# Patient Record
Sex: FEMALE | Race: OTHER | HISPANIC OR LATINO | Employment: FULL TIME | URBAN - METROPOLITAN AREA
[De-identification: names, ages, dates, MRNs, and addresses within clinical notes are randomized per-mention and may not be internally consistent; named-entity substitution may affect disease eponyms.]

---

## 2018-06-22 DIAGNOSIS — Z12.39 BREAST CANCER SCREENING: ICD-10-CM

## 2018-06-27 LAB
HUMAN PAPILLOMAVIRUS (HPV): NORMAL
PAP SMEAR: NORMAL

## 2018-07-16 ENCOUNTER — TELEPHONE (OUTPATIENT)
Dept: INTERNAL MEDICINE | Facility: CLINIC | Age: 48
End: 2018-07-16

## 2018-07-16 NOTE — TELEPHONE ENCOUNTER
----- Message from Suzette Thorpe sent at 7/16/2018  4:05 PM CDT -----  Contact: WALLY ECKERT [[id number]]            Name of Who is Calling: WALLY ECKERT [[id number]]    What is the request in detail: patient would like a call back states she felt dizzy today at work and would like to see doctor as soon as possible. Please call     Can the clinic reply by [facility]: no    What Number to Call Back if not in [facility]: [phone]

## 2018-07-16 NOTE — TELEPHONE ENCOUNTER
----- Message from Marge Smallwood sent at 7/16/2018  4:35 PM CDT -----  Contact: pt            Name of Who is Calling: pt       What is the request in detail: pt returned the nurse's phone call. Call pt      Can the clinic reply by [facility]: no      What [facility] Back [facility]: [phone]    5:09 PM  Patient has been scheduled as requested.

## 2018-07-17 ENCOUNTER — OFFICE VISIT (OUTPATIENT)
Dept: PRIMARY CARE CLINIC | Facility: CLINIC | Age: 48
End: 2018-07-17
Attending: FAMILY MEDICINE
Payer: COMMERCIAL

## 2018-07-17 VITALS
DIASTOLIC BLOOD PRESSURE: 70 MMHG | TEMPERATURE: 98 F | HEIGHT: 62 IN | OXYGEN SATURATION: 100 % | BODY MASS INDEX: 26.45 KG/M2 | HEART RATE: 83 BPM | WEIGHT: 143.75 LBS | SYSTOLIC BLOOD PRESSURE: 118 MMHG

## 2018-07-17 DIAGNOSIS — H91.92 HEARING DECREASED, LEFT: ICD-10-CM

## 2018-07-17 DIAGNOSIS — J30.9 ALLERGIC RHINITIS, UNSPECIFIED SEASONALITY, UNSPECIFIED TRIGGER: ICD-10-CM

## 2018-07-17 DIAGNOSIS — H61.22 IMPACTED CERUMEN OF LEFT EAR: Primary | ICD-10-CM

## 2018-07-17 PROCEDURE — 3008F BODY MASS INDEX DOCD: CPT | Mod: CPTII,S$GLB,, | Performed by: NURSE PRACTITIONER

## 2018-07-17 PROCEDURE — 99999 PR PBB SHADOW E&M-EST. PATIENT-LVL IV: CPT | Mod: PBBFAC,,, | Performed by: NURSE PRACTITIONER

## 2018-07-17 PROCEDURE — 99213 OFFICE O/P EST LOW 20 MIN: CPT | Mod: S$GLB,,, | Performed by: NURSE PRACTITIONER

## 2018-07-17 NOTE — PROGRESS NOTES
Chief Complaint: Dizziness (thinking it may be from sinus); Sinus Problem (taking mucinex, tylenol); and Otalgia      HPI     Lila Pineda is a 47 y.o. female who presents for an urgent visit today. She is an established patient of Dr Vinson but new to me.      She felt dizzy yesterday for a few minutes, like the room was spinning. She put her head down and the dizziness resolved. She is also complaining of left ear fullness and pressure. No pain. Hearing is muted. Denies chest pain and SOB.    She has been having sinus/allergies problems for the past week, with sneezing and runny nose. She took Mucinex (with Tylenol and decongestant) a couple of days ago with mild relief.  +post nasal drip +dry cough. Denies fever/chills.        Past Medical History:  Past Medical History:   Diagnosis Date    Perimenopausal     Shingles        Review of Systems:  Review of Systems   Constitutional: Negative for appetite change, chills, fatigue and fever.   HENT: Positive for congestion, ear pain, postnasal drip, rhinorrhea, sinus pressure and sneezing. Negative for sinus pain and sore throat.    Eyes: Negative for visual disturbance.   Respiratory: Positive for cough (dry). Negative for chest tightness and shortness of breath.    Cardiovascular: Negative for chest pain, palpitations and leg swelling.   Gastrointestinal: Negative for abdominal pain, blood in stool, constipation, diarrhea, nausea and vomiting.   Endocrine: Negative for polydipsia, polyphagia and polyuria.   Genitourinary: Negative for difficulty urinating, dysuria, flank pain and frequency.   Musculoskeletal: Negative for arthralgias, back pain, joint swelling and myalgias.   Skin: Negative for rash.   Neurological: Positive for dizziness (resolved). Negative for syncope, weakness, light-headedness and headaches.   Psychiatric/Behavioral: Negative for dysphoric mood and sleep disturbance. The patient is not nervous/anxious.          PHYSICAL EXAM  "    Vitals:    07/17/18 0920 07/17/18 1017   BP: 137/77 118/70   Pulse: 83    Temp: 98.3 °F (36.8 °C)    TempSrc: Axillary    SpO2: 100%    Weight: 65.2 kg (143 lb 11.8 oz)    Height: 5' 2" (1.575 m)        Physical Exam   Constitutional: She is oriented to person, place, and time. She appears well-developed and well-nourished.   HENT:   Head: Normocephalic and atraumatic.   Right Ear: Hearing, tympanic membrane, external ear and ear canal normal. Tympanic membrane is not injected and not erythematous. No middle ear effusion. No decreased hearing is noted.   Left Ear: External ear and ear canal normal. Decreased hearing is noted.   Nose: Nose normal.   Mouth/Throat: Oropharynx is clear and moist. No oropharyngeal exudate.   Left ear with cerumen impaction. Unable to visualize TM.   Eyes: Conjunctivae and EOM are normal. Pupils are equal, round, and reactive to light.   Neck: Normal range of motion. Neck supple. No tracheal deviation present. No thyromegaly present.   Cardiovascular: Normal rate, regular rhythm, normal heart sounds and intact distal pulses.    No murmur heard.  Pulmonary/Chest: Effort normal and breath sounds normal. No respiratory distress. She has no wheezes. She has no rales.   Lymphadenopathy:     She has no cervical adenopathy.   Neurological: She is alert and oriented to person, place, and time.   Skin: Skin is warm and dry. She is not diaphoretic. No erythema.   Psychiatric: She has a normal mood and affect. Her behavior is normal. Thought content normal.       Assessment:       1. Impacted cerumen of left ear    2. Allergic rhinitis, unspecified seasonality, unspecified trigger    3. Hearing decreased, left        Plan:       Lila was seen today for dizziness, sinus problem and otalgia.    Diagnoses and all orders for this visit:    Impacted cerumen of left ear-unable to visualize left TM. Right TM unremarkable. Recommended Debrox solution and referral to ENT for removal. May/may not have " been cause for brief episode of dizziness/vertigo x1. ENT can further evaluate dizziness if  re-occurs.  -     Ambulatory Referral to ENT    Allergic rhinitis, unspecified seasonality, unspecified trigger-Recommended nasal irrigations followed by Flonase daily, and an antihistamine (zyrtec or claritin).    Hearing decreased, left-likely related to cerumen impaction.    Follow up with PCP as needed. Call clinic if symptoms fail to improve.

## 2018-07-17 NOTE — PATIENT INSTRUCTIONS
1)Distilled salt water sinus rinses via neti pots or products such as Kb Med Sinus Rinse or Sinugator. Must wash container or device and use bottled water or distilled water to avoid introducing infection.  2)Nasal Steroids (Nasocort, Rhinocort, Flonase) fluticasone  3)Antihistamines(Allegra, Claritin, Xzyal, Zyrtec)  4)Decongestants (Pseudoephedrine)      Debrox solution to soften ear wax.    Will refer you to ENT

## 2019-01-07 ENCOUNTER — OFFICE VISIT (OUTPATIENT)
Dept: PRIMARY CARE CLINIC | Facility: CLINIC | Age: 49
End: 2019-01-07
Payer: COMMERCIAL

## 2019-01-07 VITALS
DIASTOLIC BLOOD PRESSURE: 77 MMHG | HEIGHT: 62 IN | WEIGHT: 142.75 LBS | OXYGEN SATURATION: 100 % | BODY MASS INDEX: 26.27 KG/M2 | SYSTOLIC BLOOD PRESSURE: 139 MMHG | TEMPERATURE: 98 F | HEART RATE: 93 BPM

## 2019-01-07 DIAGNOSIS — R59.0 CERVICAL ADENOPATHY: ICD-10-CM

## 2019-01-07 DIAGNOSIS — R60.0 SALIVARY GLAND SWELLING: Primary | ICD-10-CM

## 2019-01-07 PROCEDURE — 3008F PR BODY MASS INDEX (BMI) DOCUMENTED: ICD-10-PCS | Mod: CPTII,S$GLB,, | Performed by: NURSE PRACTITIONER

## 2019-01-07 PROCEDURE — 3008F BODY MASS INDEX DOCD: CPT | Mod: CPTII,S$GLB,, | Performed by: NURSE PRACTITIONER

## 2019-01-07 PROCEDURE — 99999 PR PBB SHADOW E&M-EST. PATIENT-LVL III: ICD-10-PCS | Mod: PBBFAC,,, | Performed by: NURSE PRACTITIONER

## 2019-01-07 PROCEDURE — 99999 PR PBB SHADOW E&M-EST. PATIENT-LVL III: CPT | Mod: PBBFAC,,, | Performed by: NURSE PRACTITIONER

## 2019-01-07 PROCEDURE — 99213 OFFICE O/P EST LOW 20 MIN: CPT | Mod: S$GLB,,, | Performed by: NURSE PRACTITIONER

## 2019-01-07 PROCEDURE — 99213 PR OFFICE/OUTPT VISIT, EST, LEVL III, 20-29 MIN: ICD-10-PCS | Mod: S$GLB,,, | Performed by: NURSE PRACTITIONER

## 2019-01-07 NOTE — PROGRESS NOTES
Chief Complaint: Adenopathy      HPI     Lila Pineda is a 48 y.o. female who presents for an urgent visit today. She is an established patient of Dr Vinson. She was seen by me on 7/17/18 for impacted cerumen and allergic rhinitis.     She c/o swollen glands for a couple of weeks now. Left cervical lymph node swells with eating, mainly with spicy food. Only lasts 30 -40 min. Tender to touch. Does have a new wire on her braces that is now hitting cheek. She has started to use wax, which has helped with the swelling. Using a mouth rinse and warm saltwater gargles as well.   Occurs every day but is getting better with mouth rinses and using wax. Does not see orthodontist again til Feb.    Denies URI, fever/night sweats (although does get hot flashes bc perimenopausal), unexplained weight loss. No cats. No foreign travel (did travel to [place] early Dec). Feels fine otherwise.      Past Medical History:  Past Medical History:   Diagnosis Date    Perimenopausal     Shingles        Review of Systems:  Review of Systems   Constitutional: Negative for activity change, appetite change, chills, diaphoresis, fatigue, fever and unexpected weight change.   HENT: Negative for congestion, sinus pressure and sore throat.    Eyes: Negative for visual disturbance.   Respiratory: Negative for cough, chest tightness, shortness of breath and wheezing.    Cardiovascular: Negative for chest pain, palpitations and leg swelling.   Gastrointestinal: Negative for abdominal pain, blood in stool, constipation, diarrhea, nausea and vomiting.   Genitourinary: Negative for difficulty urinating, dysuria, flank pain and frequency.   Musculoskeletal: Negative for arthralgias, back pain, joint swelling and myalgias.   Skin: Negative for rash.   Neurological: Negative for dizziness, syncope, weakness, light-headedness and headaches.   Hematological: Positive for adenopathy.         PHYSICAL EXAM     Vitals:    01/07/19 0809   BP: 139/77  "  BP Location: Left arm   Patient Position: Sitting   Pulse: 93   Temp: 98.4 °F (36.9 °C)   SpO2: 100%   Weight: 64.8 kg (142 lb 12 oz)   Height: 5' 2" (1.575 m)       Physical Exam   Constitutional: She is oriented to person, place, and time. She appears well-developed and well-nourished. No distress.   HENT:   Head: Normocephalic and atraumatic.   Right Ear: Tympanic membrane, external ear and ear canal normal.   Left Ear: External ear normal.   Nose: Nose normal.   Mouth/Throat: Oropharynx is clear and moist and mucous membranes are normal. Oral lesions (mild left inner cheek irritation from braces) present. No dental abscesses. No oropharyngeal exudate, posterior oropharyngeal edema, posterior oropharyngeal erythema or tonsillar abscesses. No tonsillar exudate.   Eyes: Conjunctivae and EOM are normal. Pupils are equal, round, and reactive to light.   Neck: Normal range of motion. Neck supple. No tracheal deviation present. No thyromegaly present.   Cardiovascular: Normal rate, regular rhythm, normal heart sounds and intact distal pulses. Exam reveals no friction rub.   No murmur heard.  Pulmonary/Chest: Effort normal and breath sounds normal. No stridor. No respiratory distress. She has no wheezes. She has no rales.   Abdominal: Soft. Bowel sounds are normal. She exhibits no distension and no mass. There is no tenderness. There is no guarding. No hernia.   Musculoskeletal: Normal range of motion. She exhibits no edema.   Lymphadenopathy:     She has no cervical adenopathy.   Neurological: She is alert and oriented to person, place, and time.   Skin: Skin is warm and dry. She is not diaphoretic. No erythema.   Psychiatric: She has a normal mood and affect. Her behavior is normal. Thought content normal.   Vitals reviewed.      Assessment:       1. Salivary gland swelling    2. Cervical adenopathy        Plan:       Lila CALLAWAY was seen today for adenopathy.    Diagnoses and all orders for this visit:    Salivary " gland swelling    Cervical adenopathy    PE normal. Unclear etiology. Intermittent swelling could be due to braces rubbing or due to salivary stone? (although none palpated).  No lymphadenopathy on exam today.  Encouraged sooner Orthodontist appt to make adjustments, moist warm compresses to area, and ibuprofen as needed. Can also try hard sour candies, if due to stone.  She will call me if no improvements in the next 1 -2 weeks and will investigate further.    Follow up with PCP as needed or if symptoms worsen or fail to improve over the next several days.

## 2020-04-21 DIAGNOSIS — Z01.84 ANTIBODY RESPONSE EXAMINATION: ICD-10-CM

## 2020-04-23 ENCOUNTER — LAB VISIT (OUTPATIENT)
Dept: LAB | Facility: HOSPITAL | Age: 50
End: 2020-04-23
Attending: INTERNAL MEDICINE
Payer: COMMERCIAL

## 2020-04-23 DIAGNOSIS — Z01.84 ANTIBODY RESPONSE EXAMINATION: ICD-10-CM

## 2020-04-23 LAB — SARS-COV-2 IGG SERPL QL IA: NEGATIVE

## 2020-04-23 PROCEDURE — 86769 SARS-COV-2 COVID-19 ANTIBODY: CPT

## 2020-04-23 PROCEDURE — 36415 COLL VENOUS BLD VENIPUNCTURE: CPT

## 2021-03-09 LAB — PAP SMEAR: NORMAL

## 2021-04-16 ENCOUNTER — PATIENT MESSAGE (OUTPATIENT)
Dept: RESEARCH | Facility: HOSPITAL | Age: 51
End: 2021-04-16

## 2021-07-15 ENCOUNTER — OFFICE VISIT (OUTPATIENT)
Dept: FAMILY MEDICINE | Facility: CLINIC | Age: 51
End: 2021-07-15
Payer: COMMERCIAL

## 2021-07-15 ENCOUNTER — PATIENT OUTREACH (OUTPATIENT)
Dept: ADMINISTRATIVE | Facility: HOSPITAL | Age: 51
End: 2021-07-15

## 2021-07-15 VITALS
HEART RATE: 90 BPM | DIASTOLIC BLOOD PRESSURE: 96 MMHG | WEIGHT: 146.81 LBS | BODY MASS INDEX: 27.02 KG/M2 | HEIGHT: 62 IN | OXYGEN SATURATION: 98 % | SYSTOLIC BLOOD PRESSURE: 124 MMHG

## 2021-07-15 DIAGNOSIS — N95.1 PERIMENOPAUSAL: ICD-10-CM

## 2021-07-15 DIAGNOSIS — Z12.31 ENCOUNTER FOR SCREENING MAMMOGRAM FOR MALIGNANT NEOPLASM OF BREAST: ICD-10-CM

## 2021-07-15 DIAGNOSIS — Z28.21 COVID-19 VIRUS VACCINATION DECLINED: ICD-10-CM

## 2021-07-15 DIAGNOSIS — Z11.59 NEED FOR HEPATITIS C SCREENING TEST: ICD-10-CM

## 2021-07-15 DIAGNOSIS — E55.9 VITAMIN D DEFICIENCY: ICD-10-CM

## 2021-07-15 DIAGNOSIS — E66.3 OVERWEIGHT WITH BODY MASS INDEX (BMI) OF 26 TO 26.9 IN ADULT: ICD-10-CM

## 2021-07-15 DIAGNOSIS — R00.2 INTERMITTENT PALPITATIONS: ICD-10-CM

## 2021-07-15 DIAGNOSIS — Z00.01 ENCOUNTER FOR GENERAL ADULT MEDICAL EXAMINATION WITH ABNORMAL FINDINGS: Primary | ICD-10-CM

## 2021-07-15 DIAGNOSIS — R03.0 ELEVATED BLOOD PRESSURE READING WITHOUT DIAGNOSIS OF HYPERTENSION: ICD-10-CM

## 2021-07-15 DIAGNOSIS — Z12.11 COLON CANCER SCREENING: ICD-10-CM

## 2021-07-15 PROCEDURE — 3008F PR BODY MASS INDEX (BMI) DOCUMENTED: ICD-10-PCS | Mod: CPTII,S$GLB,, | Performed by: FAMILY MEDICINE

## 2021-07-15 PROCEDURE — 99396 PR PREVENTIVE VISIT,EST,40-64: ICD-10-PCS | Mod: S$GLB,,, | Performed by: FAMILY MEDICINE

## 2021-07-15 PROCEDURE — 93005 EKG 12-LEAD: ICD-10-PCS | Mod: S$GLB,,, | Performed by: FAMILY MEDICINE

## 2021-07-15 PROCEDURE — 1126F AMNT PAIN NOTED NONE PRSNT: CPT | Mod: S$GLB,,, | Performed by: FAMILY MEDICINE

## 2021-07-15 PROCEDURE — 1126F PR PAIN SEVERITY QUANTIFIED, NO PAIN PRESENT: ICD-10-PCS | Mod: S$GLB,,, | Performed by: FAMILY MEDICINE

## 2021-07-15 PROCEDURE — 93010 EKG 12-LEAD: ICD-10-PCS | Mod: S$GLB,,, | Performed by: INTERNAL MEDICINE

## 2021-07-15 PROCEDURE — 3008F BODY MASS INDEX DOCD: CPT | Mod: CPTII,S$GLB,, | Performed by: FAMILY MEDICINE

## 2021-07-15 PROCEDURE — 99396 PREV VISIT EST AGE 40-64: CPT | Mod: S$GLB,,, | Performed by: FAMILY MEDICINE

## 2021-07-15 PROCEDURE — 99999 PR PBB SHADOW E&M-EST. PATIENT-LVL III: CPT | Mod: PBBFAC,,, | Performed by: FAMILY MEDICINE

## 2021-07-15 PROCEDURE — 99999 PR PBB SHADOW E&M-EST. PATIENT-LVL III: ICD-10-PCS | Mod: PBBFAC,,, | Performed by: FAMILY MEDICINE

## 2021-07-15 PROCEDURE — 93010 ELECTROCARDIOGRAM REPORT: CPT | Mod: S$GLB,,, | Performed by: INTERNAL MEDICINE

## 2021-07-15 PROCEDURE — 93005 ELECTROCARDIOGRAM TRACING: CPT | Mod: S$GLB,,, | Performed by: FAMILY MEDICINE

## 2021-07-16 ENCOUNTER — LAB VISIT (OUTPATIENT)
Dept: LAB | Facility: HOSPITAL | Age: 51
End: 2021-07-16
Attending: FAMILY MEDICINE
Payer: COMMERCIAL

## 2021-07-16 DIAGNOSIS — E55.9 VITAMIN D DEFICIENCY: ICD-10-CM

## 2021-07-16 DIAGNOSIS — Z00.01 ENCOUNTER FOR GENERAL ADULT MEDICAL EXAMINATION WITH ABNORMAL FINDINGS: ICD-10-CM

## 2021-07-16 DIAGNOSIS — Z11.59 NEED FOR HEPATITIS C SCREENING TEST: ICD-10-CM

## 2021-07-16 DIAGNOSIS — E66.3 OVERWEIGHT WITH BODY MASS INDEX (BMI) OF 26 TO 26.9 IN ADULT: ICD-10-CM

## 2021-07-16 LAB
ALBUMIN SERPL BCP-MCNC: 4.1 G/DL (ref 3.5–5.2)
ALP SERPL-CCNC: 88 U/L (ref 55–135)
ALT SERPL W/O P-5'-P-CCNC: 23 U/L (ref 10–44)
ANION GAP SERPL CALC-SCNC: 10 MMOL/L (ref 8–16)
AST SERPL-CCNC: 19 U/L (ref 10–40)
BASOPHILS # BLD AUTO: 0.04 K/UL (ref 0–0.2)
BASOPHILS NFR BLD: 0.6 % (ref 0–1.9)
BILIRUB SERPL-MCNC: 1.1 MG/DL (ref 0.1–1)
BUN SERPL-MCNC: 8 MG/DL (ref 6–20)
CALCIUM SERPL-MCNC: 9.4 MG/DL (ref 8.7–10.5)
CHLORIDE SERPL-SCNC: 106 MMOL/L (ref 95–110)
CHOLEST SERPL-MCNC: 194 MG/DL (ref 120–199)
CHOLEST/HDLC SERPL: 4.1 {RATIO} (ref 2–5)
CO2 SERPL-SCNC: 24 MMOL/L (ref 23–29)
CREAT SERPL-MCNC: 0.7 MG/DL (ref 0.5–1.4)
DIFFERENTIAL METHOD: NORMAL
EOSINOPHIL # BLD AUTO: 0 K/UL (ref 0–0.5)
EOSINOPHIL NFR BLD: 0.3 % (ref 0–8)
ERYTHROCYTE [DISTWIDTH] IN BLOOD BY AUTOMATED COUNT: 13 % (ref 11.5–14.5)
EST. GFR  (AFRICAN AMERICAN): >60 ML/MIN/1.73 M^2
EST. GFR  (NON AFRICAN AMERICAN): >60 ML/MIN/1.73 M^2
ESTIMATED AVG GLUCOSE: 97 MG/DL (ref 68–131)
GLUCOSE SERPL-MCNC: 94 MG/DL (ref 70–110)
HBA1C MFR BLD: 5 % (ref 4–5.6)
HCT VFR BLD AUTO: 39.7 % (ref 37–48.5)
HDLC SERPL-MCNC: 47 MG/DL (ref 40–75)
HDLC SERPL: 24.2 % (ref 20–50)
HGB BLD-MCNC: 13.1 G/DL (ref 12–16)
IMM GRANULOCYTES # BLD AUTO: 0.01 K/UL (ref 0–0.04)
IMM GRANULOCYTES NFR BLD AUTO: 0.1 % (ref 0–0.5)
LDLC SERPL CALC-MCNC: 119.6 MG/DL (ref 63–159)
LYMPHOCYTES # BLD AUTO: 1.8 K/UL (ref 1–4.8)
LYMPHOCYTES NFR BLD: 26.1 % (ref 18–48)
MCH RBC QN AUTO: 29.4 PG (ref 27–31)
MCHC RBC AUTO-ENTMCNC: 33 G/DL (ref 32–36)
MCV RBC AUTO: 89 FL (ref 82–98)
MONOCYTES # BLD AUTO: 0.5 K/UL (ref 0.3–1)
MONOCYTES NFR BLD: 7.5 % (ref 4–15)
NEUTROPHILS # BLD AUTO: 4.6 K/UL (ref 1.8–7.7)
NEUTROPHILS NFR BLD: 65.4 % (ref 38–73)
NONHDLC SERPL-MCNC: 147 MG/DL
NRBC BLD-RTO: 0 /100 WBC
PLATELET # BLD AUTO: 263 K/UL (ref 150–450)
PMV BLD AUTO: 11.9 FL (ref 9.2–12.9)
POTASSIUM SERPL-SCNC: 3.8 MMOL/L (ref 3.5–5.1)
PROT SERPL-MCNC: 6.8 G/DL (ref 6–8.4)
RBC # BLD AUTO: 4.46 M/UL (ref 4–5.4)
SODIUM SERPL-SCNC: 140 MMOL/L (ref 136–145)
TRIGL SERPL-MCNC: 137 MG/DL (ref 30–150)
TSH SERPL DL<=0.005 MIU/L-ACNC: 1.13 UIU/ML (ref 0.4–4)
WBC # BLD AUTO: 6.97 K/UL (ref 3.9–12.7)

## 2021-07-16 PROCEDURE — 84443 ASSAY THYROID STIM HORMONE: CPT | Performed by: FAMILY MEDICINE

## 2021-07-16 PROCEDURE — 82306 VITAMIN D 25 HYDROXY: CPT | Performed by: FAMILY MEDICINE

## 2021-07-16 PROCEDURE — 80053 COMPREHEN METABOLIC PANEL: CPT | Performed by: FAMILY MEDICINE

## 2021-07-16 PROCEDURE — 36415 COLL VENOUS BLD VENIPUNCTURE: CPT | Mod: PO | Performed by: FAMILY MEDICINE

## 2021-07-16 PROCEDURE — 80061 LIPID PANEL: CPT | Performed by: FAMILY MEDICINE

## 2021-07-16 PROCEDURE — 85025 COMPLETE CBC W/AUTO DIFF WBC: CPT | Performed by: FAMILY MEDICINE

## 2021-07-16 PROCEDURE — 86803 HEPATITIS C AB TEST: CPT | Performed by: FAMILY MEDICINE

## 2021-07-16 PROCEDURE — 83036 HEMOGLOBIN GLYCOSYLATED A1C: CPT | Performed by: FAMILY MEDICINE

## 2021-07-17 LAB — 25(OH)D3+25(OH)D2 SERPL-MCNC: 13 NG/ML (ref 30–96)

## 2021-07-19 LAB — HCV AB SERPL QL IA: NEGATIVE

## 2021-07-21 DIAGNOSIS — Z12.31 OTHER SCREENING MAMMOGRAM: ICD-10-CM

## 2021-07-23 ENCOUNTER — PATIENT MESSAGE (OUTPATIENT)
Dept: FAMILY MEDICINE | Facility: CLINIC | Age: 51
End: 2021-07-23

## 2021-10-04 ENCOUNTER — PATIENT MESSAGE (OUTPATIENT)
Dept: ADMINISTRATIVE | Facility: HOSPITAL | Age: 51
End: 2021-10-04

## 2021-10-30 ENCOUNTER — TELEPHONE (OUTPATIENT)
Dept: GASTROENTEROLOGY | Facility: CLINIC | Age: 51
End: 2021-10-30
Payer: COMMERCIAL

## 2021-11-01 ENCOUNTER — LAB VISIT (OUTPATIENT)
Dept: PRIMARY CARE CLINIC | Facility: OTHER | Age: 51
End: 2021-11-01

## 2021-11-01 DIAGNOSIS — Z11.52 ENCOUNTER FOR SCREENING FOR COVID-19: Primary | ICD-10-CM

## 2021-11-01 LAB
CTP QC/QA: YES
SARS-COV-2 AG RESP QL IA.RAPID: NEGATIVE

## 2021-11-01 RX ORDER — SODIUM, POTASSIUM,MAG SULFATES 17.5-3.13G
1 SOLUTION, RECONSTITUTED, ORAL ORAL DAILY
Qty: 1 KIT | Refills: 0 | Status: SHIPPED | OUTPATIENT
Start: 2021-11-01 | End: 2021-11-03

## 2021-11-07 ENCOUNTER — LAB VISIT (OUTPATIENT)
Dept: PRIMARY CARE CLINIC | Facility: OTHER | Age: 51
End: 2021-11-07
Payer: COMMERCIAL

## 2021-11-07 DIAGNOSIS — Z11.52 ENCOUNTER FOR SCREENING FOR COVID-19: Primary | ICD-10-CM

## 2021-11-07 LAB
CTP QC/QA: YES
SARS-COV-2 AG RESP QL IA.RAPID: NEGATIVE

## 2021-11-14 ENCOUNTER — LAB VISIT (OUTPATIENT)
Dept: PRIMARY CARE CLINIC | Facility: OTHER | Age: 51
End: 2021-11-14

## 2021-11-14 DIAGNOSIS — Z11.52 ENCOUNTER FOR SCREENING FOR COVID-19: Primary | ICD-10-CM

## 2021-11-14 LAB
CTP QC/QA: YES
SARS-COV-2 AG RESP QL IA.RAPID: NEGATIVE

## 2021-11-21 ENCOUNTER — LAB VISIT (OUTPATIENT)
Dept: PRIMARY CARE CLINIC | Facility: OTHER | Age: 51
End: 2021-11-21
Payer: COMMERCIAL

## 2021-11-21 DIAGNOSIS — Z11.52 ENCOUNTER FOR SCREENING FOR COVID-19: Primary | ICD-10-CM

## 2021-11-21 LAB
CTP QC/QA: YES
SARS-COV-2 AG RESP QL IA.RAPID: NEGATIVE

## 2021-11-29 ENCOUNTER — LAB VISIT (OUTPATIENT)
Dept: PRIMARY CARE CLINIC | Facility: OTHER | Age: 51
End: 2021-11-29

## 2021-11-29 DIAGNOSIS — Z11.52 ENCOUNTER FOR SCREENING FOR COVID-19: Primary | ICD-10-CM

## 2021-11-29 LAB
CTP QC/QA: YES
SARS-COV-2 AG RESP QL IA.RAPID: NEGATIVE

## 2021-12-06 ENCOUNTER — LAB VISIT (OUTPATIENT)
Dept: PRIMARY CARE CLINIC | Facility: OTHER | Age: 51
End: 2021-12-06

## 2021-12-06 DIAGNOSIS — Z11.52 ENCOUNTER FOR SCREENING FOR COVID-19: Primary | ICD-10-CM

## 2021-12-06 LAB
CTP QC/QA: YES
SARS-COV-2 AG RESP QL IA.RAPID: NEGATIVE

## 2021-12-06 PROCEDURE — 87811 SARS-COV-2 COVID19 W/OPTIC: CPT | Mod: ,,, | Performed by: PREVENTIVE MEDICINE

## 2021-12-06 PROCEDURE — 87811 SARS CORONAVIRUS 2 ANTIGEN POCT, MANUAL READ: ICD-10-PCS | Mod: ,,, | Performed by: PREVENTIVE MEDICINE

## 2021-12-12 ENCOUNTER — LAB VISIT (OUTPATIENT)
Dept: PRIMARY CARE CLINIC | Facility: OTHER | Age: 51
End: 2021-12-12

## 2021-12-12 DIAGNOSIS — Z11.52 ENCOUNTER FOR SCREENING FOR COVID-19: Primary | ICD-10-CM

## 2021-12-12 LAB
CTP QC/QA: YES
SARS-COV-2 AG RESP QL IA.RAPID: NEGATIVE

## 2021-12-12 PROCEDURE — 87811 SARS CORONAVIRUS 2 ANTIGEN POCT, MANUAL READ: ICD-10-PCS | Mod: ,,, | Performed by: INTERNAL MEDICINE

## 2021-12-12 PROCEDURE — 87811 SARS-COV-2 COVID19 W/OPTIC: CPT | Mod: ,,, | Performed by: INTERNAL MEDICINE

## 2021-12-13 ENCOUNTER — OFFICE VISIT (OUTPATIENT)
Dept: DERMATOLOGY | Facility: CLINIC | Age: 51
End: 2021-12-13
Payer: COMMERCIAL

## 2021-12-13 DIAGNOSIS — D48.5 NEOPLASM OF UNCERTAIN BEHAVIOR OF SKIN: Primary | ICD-10-CM

## 2021-12-13 PROCEDURE — 99203 PR OFFICE/OUTPT VISIT, NEW, LEVL III, 30-44 MIN: ICD-10-PCS | Mod: S$GLB,,, | Performed by: DERMATOLOGY

## 2021-12-13 PROCEDURE — 99203 OFFICE O/P NEW LOW 30 MIN: CPT | Mod: S$GLB,,, | Performed by: DERMATOLOGY

## 2021-12-13 PROCEDURE — 99999 PR PBB SHADOW E&M-EST. PATIENT-LVL II: ICD-10-PCS | Mod: PBBFAC,,, | Performed by: DERMATOLOGY

## 2021-12-13 PROCEDURE — 99999 PR PBB SHADOW E&M-EST. PATIENT-LVL II: CPT | Mod: PBBFAC,,, | Performed by: DERMATOLOGY

## 2021-12-13 RX ORDER — FLUTICASONE PROPIONATE 0.05 MG/G
OINTMENT TOPICAL 2 TIMES DAILY
Qty: 15 G | Refills: 0 | Status: SHIPPED | OUTPATIENT
Start: 2021-12-13 | End: 2023-09-13

## 2021-12-16 ENCOUNTER — TELEPHONE (OUTPATIENT)
Dept: ENDOSCOPY | Facility: HOSPITAL | Age: 51
End: 2021-12-16
Payer: COMMERCIAL

## 2021-12-20 ENCOUNTER — ANESTHESIA EVENT (OUTPATIENT)
Dept: ENDOSCOPY | Facility: HOSPITAL | Age: 51
End: 2021-12-20
Payer: COMMERCIAL

## 2021-12-20 ENCOUNTER — ANESTHESIA (OUTPATIENT)
Dept: ENDOSCOPY | Facility: HOSPITAL | Age: 51
End: 2021-12-20
Payer: COMMERCIAL

## 2021-12-20 ENCOUNTER — HOSPITAL ENCOUNTER (OUTPATIENT)
Facility: HOSPITAL | Age: 51
Discharge: HOME | End: 2021-12-20
Attending: INTERNAL MEDICINE | Admitting: INTERNAL MEDICINE
Payer: COMMERCIAL

## 2021-12-20 VITALS
BODY MASS INDEX: 25.95 KG/M2 | RESPIRATION RATE: 18 BRPM | WEIGHT: 141 LBS | HEART RATE: 66 BPM | OXYGEN SATURATION: 98 % | HEIGHT: 62 IN | DIASTOLIC BLOOD PRESSURE: 65 MMHG | SYSTOLIC BLOOD PRESSURE: 106 MMHG | TEMPERATURE: 98 F

## 2021-12-20 DIAGNOSIS — Z12.11 COLON CANCER SCREENING: Primary | ICD-10-CM

## 2021-12-20 LAB
B-HCG UR QL: NEGATIVE
CTP QC/QA: YES
SARS-COV-2 RDRP RESP QL NAA+PROBE: NEGATIVE

## 2021-12-20 PROCEDURE — 37000009 HC ANESTHESIA EA ADD 15 MINS: Performed by: INTERNAL MEDICINE

## 2021-12-20 PROCEDURE — G0121 COLON CA SCRN NOT HI RSK IND: ICD-10-PCS | Mod: 33,,, | Performed by: INTERNAL MEDICINE

## 2021-12-20 PROCEDURE — 25000003 PHARM REV CODE 250: Performed by: INTERNAL MEDICINE

## 2021-12-20 PROCEDURE — G0121 COLON CA SCRN NOT HI RSK IND: HCPCS | Mod: 33,,, | Performed by: INTERNAL MEDICINE

## 2021-12-20 PROCEDURE — 63600175 PHARM REV CODE 636 W HCPCS: Performed by: NURSE ANESTHETIST, CERTIFIED REGISTERED

## 2021-12-20 PROCEDURE — 25000003 PHARM REV CODE 250: Performed by: NURSE ANESTHETIST, CERTIFIED REGISTERED

## 2021-12-20 PROCEDURE — 37000008 HC ANESTHESIA 1ST 15 MINUTES: Performed by: INTERNAL MEDICINE

## 2021-12-20 PROCEDURE — U0002 COVID-19 LAB TEST NON-CDC: HCPCS | Performed by: INTERNAL MEDICINE

## 2021-12-20 PROCEDURE — G0121 COLON CA SCRN NOT HI RSK IND: HCPCS | Mod: PT | Performed by: INTERNAL MEDICINE

## 2021-12-20 PROCEDURE — 81025 URINE PREGNANCY TEST: CPT | Performed by: INTERNAL MEDICINE

## 2021-12-20 RX ORDER — SODIUM CHLORIDE 9 MG/ML
INJECTION, SOLUTION INTRAVENOUS CONTINUOUS
Status: DISCONTINUED | OUTPATIENT
Start: 2021-12-20 | End: 2021-12-20 | Stop reason: HOSPADM

## 2021-12-20 RX ORDER — LIDOCAINE HCL/PF 100 MG/5ML
SYRINGE (ML) INTRAVENOUS
Status: DISCONTINUED | OUTPATIENT
Start: 2021-12-20 | End: 2021-12-20

## 2021-12-20 RX ORDER — PROPOFOL 10 MG/ML
VIAL (ML) INTRAVENOUS
Status: DISCONTINUED | OUTPATIENT
Start: 2021-12-20 | End: 2021-12-20

## 2021-12-20 RX ORDER — SODIUM CHLORIDE 0.9 % (FLUSH) 0.9 %
10 SYRINGE (ML) INJECTION
Status: DISCONTINUED | OUTPATIENT
Start: 2021-12-20 | End: 2021-12-20 | Stop reason: HOSPADM

## 2021-12-20 RX ORDER — PROPOFOL 10 MG/ML
VIAL (ML) INTRAVENOUS CONTINUOUS PRN
Status: DISCONTINUED | OUTPATIENT
Start: 2021-12-20 | End: 2021-12-20

## 2021-12-20 RX ADMIN — LIDOCAINE HYDROCHLORIDE 75 MG: 20 INJECTION, SOLUTION INTRAVENOUS at 10:12

## 2021-12-20 RX ADMIN — PROPOFOL 100 MG: 10 INJECTION, EMULSION INTRAVENOUS at 10:12

## 2021-12-20 RX ADMIN — SODIUM CHLORIDE: 0.9 INJECTION, SOLUTION INTRAVENOUS at 10:12

## 2021-12-20 RX ADMIN — PROPOFOL 175 MCG/KG/MIN: 10 INJECTION, EMULSION INTRAVENOUS at 10:12

## 2021-12-27 ENCOUNTER — LAB VISIT (OUTPATIENT)
Dept: PRIMARY CARE CLINIC | Facility: OTHER | Age: 51
End: 2021-12-27

## 2021-12-27 DIAGNOSIS — Z11.52 ENCOUNTER FOR SCREENING FOR COVID-19: Primary | ICD-10-CM

## 2021-12-27 LAB
CTP QC/QA: YES
SARS-COV-2 RDRP RESP QL NAA+PROBE: NEGATIVE

## 2021-12-27 PROCEDURE — U0002: ICD-10-PCS | Mod: QW,,, | Performed by: PREVENTIVE MEDICINE

## 2021-12-27 PROCEDURE — U0002 COVID-19 LAB TEST NON-CDC: HCPCS | Mod: QW,,, | Performed by: PREVENTIVE MEDICINE

## 2022-01-03 ENCOUNTER — LAB VISIT (OUTPATIENT)
Dept: PRIMARY CARE CLINIC | Facility: OTHER | Age: 52
End: 2022-01-03

## 2022-01-03 DIAGNOSIS — Z11.52 ENCOUNTER FOR SCREENING FOR COVID-19: Primary | ICD-10-CM

## 2022-01-03 LAB
CTP QC/QA: YES
SARS-COV-2 AG RESP QL IA.RAPID: NEGATIVE

## 2022-01-03 PROCEDURE — 87811 SARS CORONAVIRUS 2 ANTIGEN POCT, MANUAL READ: ICD-10-PCS | Mod: ,,, | Performed by: PREVENTIVE MEDICINE

## 2022-01-03 PROCEDURE — 87811 SARS-COV-2 COVID19 W/OPTIC: CPT | Mod: ,,, | Performed by: PREVENTIVE MEDICINE

## 2022-01-10 ENCOUNTER — LAB VISIT (OUTPATIENT)
Dept: PRIMARY CARE CLINIC | Facility: OTHER | Age: 52
End: 2022-01-10

## 2022-01-10 DIAGNOSIS — Z11.52 ENCOUNTER FOR SCREENING FOR COVID-19: Primary | ICD-10-CM

## 2022-01-10 LAB
CTP QC/QA: YES
SARS-COV-2 AG RESP QL IA.RAPID: POSITIVE

## 2022-05-31 ENCOUNTER — PATIENT MESSAGE (OUTPATIENT)
Dept: ADMINISTRATIVE | Facility: HOSPITAL | Age: 52
End: 2022-05-31
Payer: COMMERCIAL

## 2022-08-24 ENCOUNTER — PATIENT MESSAGE (OUTPATIENT)
Dept: ADMINISTRATIVE | Facility: HOSPITAL | Age: 52
End: 2022-08-24
Payer: COMMERCIAL

## 2022-09-21 DIAGNOSIS — Z12.31 OTHER SCREENING MAMMOGRAM: ICD-10-CM

## 2022-09-28 DIAGNOSIS — Z12.31 OTHER SCREENING MAMMOGRAM: ICD-10-CM

## 2022-10-05 DIAGNOSIS — Z12.31 OTHER SCREENING MAMMOGRAM: ICD-10-CM

## 2022-10-10 ENCOUNTER — PATIENT MESSAGE (OUTPATIENT)
Dept: ADMINISTRATIVE | Facility: HOSPITAL | Age: 52
End: 2022-10-10
Payer: COMMERCIAL

## 2022-10-13 ENCOUNTER — PATIENT OUTREACH (OUTPATIENT)
Dept: ADMINISTRATIVE | Facility: HOSPITAL | Age: 52
End: 2022-10-13
Payer: COMMERCIAL

## 2022-10-13 ENCOUNTER — PATIENT MESSAGE (OUTPATIENT)
Dept: ADMINISTRATIVE | Facility: HOSPITAL | Age: 52
End: 2022-10-13
Payer: COMMERCIAL

## 2022-10-13 NOTE — PROGRESS NOTES
10/13/2022 Gap report audit performed. Care Everywhere updates requested and reviewed  Overdue HM topic chart audit and/or requested. LINKS triggered and reconciled. Media reviewed  Patient outreach regarding Health Maintenance/ Schedule Annual Appt -left VM /portal message sent    Health Maintenance Due   Topic Date Due    COVID-19 Vaccine (1) Never done    Mammogram  Never done    Shingles Vaccine (1 of 2) Never done    TETANUS VACCINE  04/16/2022    Influenza Vaccine (1) 09/01/2022

## 2023-01-17 ENCOUNTER — PATIENT MESSAGE (OUTPATIENT)
Dept: ADMINISTRATIVE | Facility: HOSPITAL | Age: 53
End: 2023-01-17
Payer: COMMERCIAL

## 2023-09-13 ENCOUNTER — OFFICE VISIT (OUTPATIENT)
Dept: OBSTETRICS AND GYNECOLOGY | Facility: CLINIC | Age: 53
End: 2023-09-13
Payer: COMMERCIAL

## 2023-09-13 VITALS
BODY MASS INDEX: 28.03 KG/M2 | WEIGHT: 152.31 LBS | SYSTOLIC BLOOD PRESSURE: 145 MMHG | HEIGHT: 62 IN | DIASTOLIC BLOOD PRESSURE: 88 MMHG

## 2023-09-13 DIAGNOSIS — Z12.31 ENCOUNTER FOR SCREENING MAMMOGRAM FOR MALIGNANT NEOPLASM OF BREAST: ICD-10-CM

## 2023-09-13 DIAGNOSIS — Z12.4 ROUTINE CERVICAL SMEAR: ICD-10-CM

## 2023-09-13 DIAGNOSIS — Z01.419 WELL WOMAN EXAM WITH ROUTINE GYNECOLOGICAL EXAM: Primary | ICD-10-CM

## 2023-09-13 PROCEDURE — 99386 PREV VISIT NEW AGE 40-64: CPT | Mod: S$GLB,,, | Performed by: OBSTETRICS & GYNECOLOGY

## 2023-09-13 PROCEDURE — 3008F PR BODY MASS INDEX (BMI) DOCUMENTED: ICD-10-PCS | Mod: CPTII,S$GLB,, | Performed by: OBSTETRICS & GYNECOLOGY

## 2023-09-13 PROCEDURE — 3077F SYST BP >= 140 MM HG: CPT | Mod: CPTII,S$GLB,, | Performed by: OBSTETRICS & GYNECOLOGY

## 2023-09-13 PROCEDURE — 1160F RVW MEDS BY RX/DR IN RCRD: CPT | Mod: CPTII,S$GLB,, | Performed by: OBSTETRICS & GYNECOLOGY

## 2023-09-13 PROCEDURE — 99999 PR PBB SHADOW E&M-EST. PATIENT-LVL III: CPT | Mod: PBBFAC,,, | Performed by: OBSTETRICS & GYNECOLOGY

## 2023-09-13 PROCEDURE — 1159F PR MEDICATION LIST DOCUMENTED IN MEDICAL RECORD: ICD-10-PCS | Mod: CPTII,S$GLB,, | Performed by: OBSTETRICS & GYNECOLOGY

## 2023-09-13 PROCEDURE — 88141 CYTOPATH C/V INTERPRET: CPT | Mod: ,,, | Performed by: PATHOLOGY

## 2023-09-13 PROCEDURE — 99386 PR PREVENTIVE VISIT,NEW,40-64: ICD-10-PCS | Mod: S$GLB,,, | Performed by: OBSTETRICS & GYNECOLOGY

## 2023-09-13 PROCEDURE — 3077F PR MOST RECENT SYSTOLIC BLOOD PRESSURE >= 140 MM HG: ICD-10-PCS | Mod: CPTII,S$GLB,, | Performed by: OBSTETRICS & GYNECOLOGY

## 2023-09-13 PROCEDURE — 3079F DIAST BP 80-89 MM HG: CPT | Mod: CPTII,S$GLB,, | Performed by: OBSTETRICS & GYNECOLOGY

## 2023-09-13 PROCEDURE — 99999 PR PBB SHADOW E&M-EST. PATIENT-LVL III: ICD-10-PCS | Mod: PBBFAC,,, | Performed by: OBSTETRICS & GYNECOLOGY

## 2023-09-13 PROCEDURE — 87624 HPV HI-RISK TYP POOLED RSLT: CPT | Performed by: OBSTETRICS & GYNECOLOGY

## 2023-09-13 PROCEDURE — 88141 PR  CYTOPATH CERV/VAG INTERPRET: ICD-10-PCS | Mod: ,,, | Performed by: PATHOLOGY

## 2023-09-13 PROCEDURE — 1159F MED LIST DOCD IN RCRD: CPT | Mod: CPTII,S$GLB,, | Performed by: OBSTETRICS & GYNECOLOGY

## 2023-09-13 PROCEDURE — 3079F PR MOST RECENT DIASTOLIC BLOOD PRESSURE 80-89 MM HG: ICD-10-PCS | Mod: CPTII,S$GLB,, | Performed by: OBSTETRICS & GYNECOLOGY

## 2023-09-13 PROCEDURE — 88175 CYTOPATH C/V AUTO FLUID REDO: CPT | Performed by: PATHOLOGY

## 2023-09-13 PROCEDURE — 3008F BODY MASS INDEX DOCD: CPT | Mod: CPTII,S$GLB,, | Performed by: OBSTETRICS & GYNECOLOGY

## 2023-09-13 PROCEDURE — 1160F PR REVIEW ALL MEDS BY PRESCRIBER/CLIN PHARMACIST DOCUMENTED: ICD-10-PCS | Mod: CPTII,S$GLB,, | Performed by: OBSTETRICS & GYNECOLOGY

## 2023-09-13 NOTE — PROGRESS NOTES
"OBSTETRIC HISTORY:   OB History          3    Para   3    Term   3            AB        Living   3         SAB        IAB        Ectopic        Multiple        Live Births   3                  COMPREHENSIVE GYN HISTORY:  PAP History: Denies abnormal Paps.  Infection History: Denies STDs. Denies PID.  Benign History: Denies uterine fibroids. Denies ovarian cysts. Denies endometriosis.   Cancer History: Denies cervical cancer. Denies uterine cancer or hyperplasia. Denies ovarian cancer. Denies vulvar cancer or pre-cancer. Denies vaginal cancer or pre-cancer. Denies breast cancer. Denies colon cancer.  Sexual Activity History:   reports being sexually active and has had partner(s) who are male.   Menstrual History: Almost a year without a period in November.     HPI:   52 y.o.  Patient's last menstrual period was 2021.   Patient is  here for her annual gynecologic exam.  She has no GYN complaints but has some lower cramping. She denies bladder, breast and bowel complaints.    ROS:  GENERAL: Denies weight gain or weight loss. Feeling well overall.   SKIN: Denies rash or lesions.   HEAD: Denies headache.   NODES: Denies enlarged lymph nodes.   CHEST: Denies shortness of breath.   ABDOMEN: No abdominal pain, constipation, diarrhea, nausea, vomiting or rectal bleeding.   URINARY: No frequency, dysuria, hematuria, or burning on urination.  REPRODUCTIVE: See HPI.   BREASTS: The patient denies pain, lumps, or nipple discharge.   HEMATOLOGIC: No easy bruisability.   MUSCULOSKELETAL: Denies joint pain or back pain.   NEUROLOGIC: Denies weakness.   PSYCHIATRIC: Denies depression, anxiety or mood swings.    PE:   BP (!) 145/88   Ht 5' 2" (1.575 m)   Wt 69.1 kg (152 lb 5.4 oz)   LMP 2021   BMI 27.86 kg/m²   APPEARANCE: Well nourished, well developed, in no acute distress.  NECK: Neck symmetric without  thyromegaly.  NODES: No inguinal, cervical lymph node enlargement.  CHEST: Lungs clear to " auscultation.  HEART: Regular rate and rhythm, no murmurs, rubs or gallops.  ABDOMEN: Soft. No tenderness or masses. No hernias. Asymmetry of ASIS  BREASTS: Symmetrical, no skin changes or visible lesions. No palpable masses, nipple discharge or adenopathy bilaterally.  PELVIC:   VULVA: No lesions. Normal female genitalia.  URETHRAL MEATUS: Normal size and location, no lesions, no prolapse.  URETHRA: No masses, tenderness, prolapse or scarring.  VAGINA: Moist and well rugated, no discharge, no significant cystocele or rectocele.  CERVIX: No lesions and discharge.  UTERUS: Normal size, regular shape, mobile, non-tender, bladder base nontender.  ADNEXA: No masses or tenderness.    PROCEDURES:  Pap smear  HRHPV    Assessment:  Normal Gynecologic Exam  Poor posture    Plan:  Mammogram and Colonoscopy if indicated by current recommendations.  Return to clinic in one year or for any problems or complaints.    Counseling:  Patient was counseled today on A.C.S. Pap guidelines and recommendations for yearly pelvic exams and monthly self breast exams; to see her PCP for other health maintenance. Regular exercise and healthy diet.     As of April 1, 2021, the Cures Act has been passed nationally. This new law requires that all doctors progress notes, lab results, pathology reports and radiology reports be released IMMEDIATELY to the patient in the patient portal. That means that the results are released to you at the EXACT same time they are released to me. Therefore, with all of the patients that I have I am not able to reply to each patient exactly when the results come in. So there will be a delay from when you see the results to when I see them and have time to come up with a response to send you. Also I only see these results when I am on the computer at work. So if the results come in over the weekend or after 5 pm of a work day, I will not see them until the next business day. As you can tell, this is a challenge as a  physician to give every patient the quick response they hope for and deserve. So please be patient!     Thanks for understanding,

## 2023-09-19 LAB
FINAL PATHOLOGIC DIAGNOSIS: ABNORMAL
HPV HR 12 DNA SPEC QL NAA+PROBE: NEGATIVE
HPV16 AG SPEC QL: NEGATIVE
HPV18 DNA SPEC QL NAA+PROBE: NEGATIVE
Lab: ABNORMAL

## 2023-09-22 ENCOUNTER — TELEPHONE (OUTPATIENT)
Dept: OBSTETRICS AND GYNECOLOGY | Facility: CLINIC | Age: 53
End: 2023-09-22
Payer: COMMERCIAL

## 2023-09-22 NOTE — TELEPHONE ENCOUNTER
----- Message from Phi Yanez sent at 9/21/2023 11:42 AM CDT -----  Contact: pt  Type: Requesting to speak with nurse        Who Called: PT  Regarding: discuss test results and last visit   Would the patient rather a call back or a response via [facility]? Call back  Best Call Back Number: [phone]  Additional Information:

## 2023-09-27 ENCOUNTER — HOSPITAL ENCOUNTER (OUTPATIENT)
Dept: RADIOLOGY | Facility: HOSPITAL | Age: 53
Discharge: HOME | End: 2023-09-27
Attending: OBSTETRICS & GYNECOLOGY
Payer: COMMERCIAL

## 2023-09-27 VITALS — BODY MASS INDEX: 27.97 KG/M2 | HEIGHT: 62 IN | WEIGHT: 152 LBS

## 2023-09-27 DIAGNOSIS — Z12.31 ENCOUNTER FOR SCREENING MAMMOGRAM FOR MALIGNANT NEOPLASM OF BREAST: ICD-10-CM

## 2023-09-27 PROCEDURE — 77067 SCR MAMMO BI INCL CAD: CPT | Mod: 26,,, | Performed by: RADIOLOGY

## 2023-09-27 PROCEDURE — 77063 BREAST TOMOSYNTHESIS BI: CPT | Mod: 26,,, | Performed by: RADIOLOGY

## 2023-09-27 PROCEDURE — 77063 MAMMO DIGITAL SCREENING BILAT WITH TOMO: ICD-10-PCS | Mod: 26,,, | Performed by: RADIOLOGY

## 2023-09-27 PROCEDURE — 77067 SCR MAMMO BI INCL CAD: CPT | Mod: TC

## 2023-09-27 PROCEDURE — 77067 MAMMO DIGITAL SCREENING BILAT WITH TOMO: ICD-10-PCS | Mod: 26,,, | Performed by: RADIOLOGY

## 2023-10-22 NOTE — PROGRESS NOTES
[facility] Progress Note  10/27/2023          Reason for Visit:      had concerns including Establish Care.       History of Present Illness:     Pt is here today to establish care and for a wellness visit    Vit D Def  Pt has a history of vitamin D Deficiency.  She took 69830 IU of vitamin D weekly for a while, but started daily otc, but at some point stopped it.  Recently has been feeling fatigued and wonders if her Vit D is low again.  She would like to get it rechecked.    Chronic back pain.  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  She notices that when she is standing for long periods, she tends to get pain in right low back that radiates down the right buttock/leg.   She has seen chiropractor and had adjustments that helped.  She is interested in the healthy back program and would like a referral.      Pt decliend flu shot, covid shot.  Encouraged shingrix at pharmacy  Pt thinks she has had a tetanus shot in past 10 years and will check on that.  Up to date on Mmg, PAP, Colonosocpy      Problem List:     Patient Active Problem List   Diagnosis    Perimenopausal    Vitamin D deficiency         Surgical History:     She has a past surgical history that includes breast reduction  (Right); Colonoscopy (N/A, 12/20/2021); and Breast surgery (Bilateral).      Family History:      Her family history includes Breast cancer (age of onset: 49) in her sister; Epilepsy in her sister; Hashimoto's thyroiditis in her mother; Immunodeficiency in her daughter; Liver disease in her mother.      Social History:     She reports that she has never smoked. She has never used smokeless tobacco. She reports current alcohol use. She reports that she does not use drugs.      Medications:     No current outpatient medications on file.        Allergies:   She has No Known Allergies.      Review of Systems:     Review of Systems   Constitutional:  Negative for chills and fever.   HENT:   "Negative for ear pain and sore throat.    Respiratory:  Negative for cough, shortness of breath and wheezing.    Cardiovascular:  Negative for chest pain and palpitations.   Gastrointestinal:  Negative for constipation, diarrhea, nausea and vomiting.   Genitourinary:  Negative for dysuria and hematuria.   Musculoskeletal:  Negative for joint swelling and joint deformity.   Neurological:  Negative for dizziness and weakness.     Physical Exam:     Temp:             Blood Pressure:  134/89        Pulse:   88     Respirations:  16  Weight:   68.1 kg (150 lb 2.1 oz)  Height:   5' 2" (1.575 m)  BMI:     Body mass index is 27.46 kg/m².    Physical Exam  Constitutional:       General: She is not in acute distress.  HENT:      Right Ear: Tympanic membrane normal.      Left Ear: Tympanic membrane normal.      Nose: No congestion or rhinorrhea.      Mouth/Throat:      Pharynx: No oropharyngeal exudate or posterior oropharyngeal erythema.   Cardiovascular:      Rate and Rhythm: Normal rate and regular rhythm.   Pulmonary:      Effort: Pulmonary effort is normal. No respiratory distress.      Breath sounds: No wheezing.   Abdominal:      Palpations: Abdomen is soft.      Tenderness: There is no abdominal tenderness.   Skin:     General: Skin is warm.   Neurological:      General: No focal deficit present.      Mental Status: She is alert and oriented to person, place, and time.           Labs/Imaging/Testing:     Most recent CBC:  Lab Results   Component Value Date    WBC 6.97 07/16/2021    HGB 13.1 07/16/2021     07/16/2021    MCV 89 07/16/2021       Most recent Lipids:  Lab Results   Component Value Date    CHOL 194 07/16/2021    HDL 47 07/16/2021    LDLCALC 119.6 07/16/2021    TRIG 137 07/16/2021       Most recent Chemistry:  Lab Results   Component Value Date     07/16/2021    K 3.8 07/16/2021     07/16/2021    CO2 24 07/16/2021    BUN 8 07/16/2021    CREATININE 0.7 07/16/2021    GLU 94 07/16/2021    " CALCIUM 9.4 07/16/2021    ALT 23 07/16/2021    AST 19 07/16/2021    ALKPHOS 88 07/16/2021    PROT 6.8 07/16/2021    ALBUMIN 4.1 07/16/2021       Other tests:  Lab Results   Component Value Date    TSH 1.133 07/16/2021    FREET4 0.97 09/14/2016    INR 0.9 12/21/2011    HGBA1C 5.0 07/16/2021    IRON 62 09/14/2016    FERRITIN 20 09/14/2016    JJODYCIX46 369 09/14/2016    SVJAEVXV19OB 13 (L) 07/16/2021    SEDRATE 3 07/12/2006             Assessment and Plan:     1. Well adult exam  - CBC Auto Differential; Future  - Comprehensive Metabolic Panel; Future  - Lipid Panel; Future  - Hemoglobin A1C; Future  - TSH; Future  - Vitamin D; Future    2. Vitamin D deficiency  - Vitamin D; Future    3. Chronic back pain, unspecified back location, unspecified back pain laterality  - Ambulatory referral/consult to [facility]; Future     Pt declined shots today    Follow up 12 mos or sooner prn.  If vitamin D is low, will plan to restart replacement and recheck        Thanh Galdamez MD  10/27/2023    This note was prepared using voice-recognition software.  Although efforts are made to proofread the note, some errors may persist in the final document.

## 2023-10-27 ENCOUNTER — OFFICE VISIT (OUTPATIENT)
Dept: PRIMARY CARE CLINIC | Facility: CLINIC | Age: 53
End: 2023-10-27
Payer: COMMERCIAL

## 2023-10-27 ENCOUNTER — LAB VISIT (OUTPATIENT)
Dept: LAB | Facility: HOSPITAL | Age: 53
End: 2023-10-27
Attending: INTERNAL MEDICINE
Payer: COMMERCIAL

## 2023-10-27 VITALS
DIASTOLIC BLOOD PRESSURE: 89 MMHG | BODY MASS INDEX: 27.63 KG/M2 | HEART RATE: 88 BPM | HEIGHT: 62 IN | WEIGHT: 150.13 LBS | OXYGEN SATURATION: 98 % | RESPIRATION RATE: 16 BRPM | SYSTOLIC BLOOD PRESSURE: 134 MMHG

## 2023-10-27 DIAGNOSIS — G89.29 CHRONIC BACK PAIN, UNSPECIFIED BACK LOCATION, UNSPECIFIED BACK PAIN LATERALITY: ICD-10-CM

## 2023-10-27 DIAGNOSIS — Z00.00 WELL ADULT EXAM: Primary | ICD-10-CM

## 2023-10-27 DIAGNOSIS — Z00.00 WELL ADULT EXAM: ICD-10-CM

## 2023-10-27 DIAGNOSIS — E55.9 VITAMIN D DEFICIENCY: ICD-10-CM

## 2023-10-27 DIAGNOSIS — M54.9 CHRONIC BACK PAIN, UNSPECIFIED BACK LOCATION, UNSPECIFIED BACK PAIN LATERALITY: ICD-10-CM

## 2023-10-27 LAB
25(OH)D3+25(OH)D2 SERPL-MCNC: 32 NG/ML (ref 30–96)
ALBUMIN SERPL BCP-MCNC: 4.2 G/DL (ref 3.5–5.2)
ALP SERPL-CCNC: 112 U/L (ref 55–135)
ALT SERPL W/O P-5'-P-CCNC: 37 U/L (ref 10–44)
ANION GAP SERPL CALC-SCNC: 11 MMOL/L (ref 8–16)
AST SERPL-CCNC: 23 U/L (ref 10–40)
BASOPHILS # BLD AUTO: 0.03 K/UL (ref 0–0.2)
BASOPHILS NFR BLD: 0.4 % (ref 0–1.9)
BILIRUB SERPL-MCNC: 0.9 MG/DL (ref 0.1–1)
BUN SERPL-MCNC: 9 MG/DL (ref 6–20)
CALCIUM SERPL-MCNC: 9.2 MG/DL (ref 8.7–10.5)
CHLORIDE SERPL-SCNC: 107 MMOL/L (ref 95–110)
CHOLEST SERPL-MCNC: 217 MG/DL (ref 120–199)
CHOLEST/HDLC SERPL: 4.3 {RATIO} (ref 2–5)
CO2 SERPL-SCNC: 23 MMOL/L (ref 23–29)
CREAT SERPL-MCNC: 0.7 MG/DL (ref 0.5–1.4)
DIFFERENTIAL METHOD: NORMAL
EOSINOPHIL # BLD AUTO: 0 K/UL (ref 0–0.5)
EOSINOPHIL NFR BLD: 0.5 % (ref 0–8)
ERYTHROCYTE [DISTWIDTH] IN BLOOD BY AUTOMATED COUNT: 13 % (ref 11.5–14.5)
EST. GFR  (NO RACE VARIABLE): >60 ML/MIN/1.73 M^2
ESTIMATED AVG GLUCOSE: 105 MG/DL (ref 68–131)
GLUCOSE SERPL-MCNC: 87 MG/DL (ref 70–110)
HBA1C MFR BLD: 5.3 % (ref 4–5.6)
HCT VFR BLD AUTO: 43.1 % (ref 37–48.5)
HDLC SERPL-MCNC: 50 MG/DL (ref 40–75)
HDLC SERPL: 23 % (ref 20–50)
HGB BLD-MCNC: 15.2 G/DL (ref 12–16)
IMM GRANULOCYTES # BLD AUTO: 0.02 K/UL (ref 0–0.04)
IMM GRANULOCYTES NFR BLD AUTO: 0.3 % (ref 0–0.5)
LDLC SERPL CALC-MCNC: 134.8 MG/DL (ref 63–159)
LYMPHOCYTES # BLD AUTO: 2.3 K/UL (ref 1–4.8)
LYMPHOCYTES NFR BLD: 30.8 % (ref 18–48)
MCH RBC QN AUTO: 30.8 PG (ref 27–31)
MCHC RBC AUTO-ENTMCNC: 35.3 G/DL (ref 32–36)
MCV RBC AUTO: 87 FL (ref 82–98)
MONOCYTES # BLD AUTO: 0.5 K/UL (ref 0.3–1)
MONOCYTES NFR BLD: 6.2 % (ref 4–15)
NEUTROPHILS # BLD AUTO: 4.6 K/UL (ref 1.8–7.7)
NEUTROPHILS NFR BLD: 61.8 % (ref 38–73)
NONHDLC SERPL-MCNC: 167 MG/DL
NRBC BLD-RTO: 0 /100 WBC
PLATELET # BLD AUTO: 273 K/UL (ref 150–450)
PMV BLD AUTO: 11.8 FL (ref 9.2–12.9)
POTASSIUM SERPL-SCNC: 3.5 MMOL/L (ref 3.5–5.1)
PROT SERPL-MCNC: 6.9 G/DL (ref 6–8.4)
RBC # BLD AUTO: 4.94 M/UL (ref 4–5.4)
SODIUM SERPL-SCNC: 141 MMOL/L (ref 136–145)
TRIGL SERPL-MCNC: 161 MG/DL (ref 30–150)
TSH SERPL DL<=0.005 MIU/L-ACNC: 1.15 UIU/ML (ref 0.4–4)
WBC # BLD AUTO: 7.44 K/UL (ref 3.9–12.7)

## 2023-10-27 PROCEDURE — 83036 HEMOGLOBIN GLYCOSYLATED A1C: CPT | Performed by: INTERNAL MEDICINE

## 2023-10-27 PROCEDURE — 3079F DIAST BP 80-89 MM HG: CPT | Mod: CPTII,S$GLB,, | Performed by: INTERNAL MEDICINE

## 2023-10-27 PROCEDURE — 80053 COMPREHEN METABOLIC PANEL: CPT | Performed by: INTERNAL MEDICINE

## 2023-10-27 PROCEDURE — 1159F PR MEDICATION LIST DOCUMENTED IN MEDICAL RECORD: ICD-10-PCS | Mod: CPTII,S$GLB,, | Performed by: INTERNAL MEDICINE

## 2023-10-27 PROCEDURE — 85025 COMPLETE CBC W/AUTO DIFF WBC: CPT | Performed by: INTERNAL MEDICINE

## 2023-10-27 PROCEDURE — 3079F PR MOST RECENT DIASTOLIC BLOOD PRESSURE 80-89 MM HG: ICD-10-PCS | Mod: CPTII,S$GLB,, | Performed by: INTERNAL MEDICINE

## 2023-10-27 PROCEDURE — 99396 PREV VISIT EST AGE 40-64: CPT | Mod: S$GLB,,, | Performed by: INTERNAL MEDICINE

## 2023-10-27 PROCEDURE — 80061 LIPID PANEL: CPT | Performed by: INTERNAL MEDICINE

## 2023-10-27 PROCEDURE — 3075F SYST BP GE 130 - 139MM HG: CPT | Mod: CPTII,S$GLB,, | Performed by: INTERNAL MEDICINE

## 2023-10-27 PROCEDURE — 3075F PR MOST RECENT SYSTOLIC BLOOD PRESS GE 130-139MM HG: ICD-10-PCS | Mod: CPTII,S$GLB,, | Performed by: INTERNAL MEDICINE

## 2023-10-27 PROCEDURE — 1159F MED LIST DOCD IN RCRD: CPT | Mod: CPTII,S$GLB,, | Performed by: INTERNAL MEDICINE

## 2023-10-27 PROCEDURE — 99999 PR PBB SHADOW E&M-EST. PATIENT-LVL IV: CPT | Mod: PBBFAC,,, | Performed by: INTERNAL MEDICINE

## 2023-10-27 PROCEDURE — 3008F PR BODY MASS INDEX (BMI) DOCUMENTED: ICD-10-PCS | Mod: CPTII,S$GLB,, | Performed by: INTERNAL MEDICINE

## 2023-10-27 PROCEDURE — 84443 ASSAY THYROID STIM HORMONE: CPT | Performed by: INTERNAL MEDICINE

## 2023-10-27 PROCEDURE — 82306 VITAMIN D 25 HYDROXY: CPT | Performed by: INTERNAL MEDICINE

## 2023-10-27 PROCEDURE — 3008F BODY MASS INDEX DOCD: CPT | Mod: CPTII,S$GLB,, | Performed by: INTERNAL MEDICINE

## 2023-10-27 PROCEDURE — 99999 PR PBB SHADOW E&M-EST. PATIENT-LVL IV: ICD-10-PCS | Mod: PBBFAC,,, | Performed by: INTERNAL MEDICINE

## 2023-10-27 PROCEDURE — 99396 PR PREVENTIVE VISIT,EST,40-64: ICD-10-PCS | Mod: S$GLB,,, | Performed by: INTERNAL MEDICINE

## 2023-10-27 PROCEDURE — 36415 COLL VENOUS BLD VENIPUNCTURE: CPT | Mod: PN | Performed by: INTERNAL MEDICINE

## 2023-12-04 NOTE — PROGRESS NOTES
Subjective:     Patient ID: Lila Pineda is a 53 y.o. female.    Chief Complaint: No chief complaint on file.    Ms Pineda is a 54 yo female sent in consultation by Dr. Galdamez  for evaluation for the healthy back lumbar program.  she has had low back pain for 3-4 years when she was standing a lot.  The pain is right lower back dominant, and down the back right leg to the knee and also right foot pain. And some left back pain as well.  The pain is throbbing and achy.  There is no tingling, numbness, or weakness.  She has burning the top of the right foot.  The pain is intermittent 0-4/10.  It is worse with bending, leaning forward, sweeps, mops, pushing wheelchair, stairs, sit, at the end of shift, lying on back. Worse in any position for too long  It is better with stretching, standing and walking, changing position, lying on left side with a pillow, morning. She has not had any treatment recently.  She went to chiropractor 4 times 3 years ago with relief.  Her goals are to bend, sweep and mop, and push patients in wheelchair.  Pattern 1    Past Medical History:  No date: Perimenopausal  No date: Shingles    Past Surgical History:  No date: breast reduction ; Right      Comment:  right breast only-2011  No date: BREAST SURGERY; Bilateral      Comment:  mastoplexy-2011 12/20/2021: COLONOSCOPY; N/A      Comment:  Procedure: COLONOSCOPY SUPREP RAPID TEST;  Surgeon:                Delbert Encinas MD;  Location: [facility];                 Service: Endoscopy;  Laterality: N/A;    Review of patient's family history indicates:  Problem: Liver disease      Relation: Mother          Age of Onset: (Not Specified)  Problem: Hashimoto's thyroiditis      Relation: Mother          Age of Onset: (Not Specified)  Problem: Breast cancer      Relation: Sister          Age of Onset: 49  Problem: Epilepsy      Relation: Sister          Age of Onset: (Not Specified)  Problem: Immunodeficiency      Relation: Daughter           Age of Onset: (Not Specified)          Comment: CVID      Social History    Socioeconomic History      Marital status:     Occupational History      Occupation: Nurse        Comment: [employer]     Tobacco Use      Smoking status: Never      Smokeless tobacco: Never    Substance and Sexual Activity      Alcohol use: Yes        Comment: rare - holidays only      Drug use: Never      Sexual activity: Yes        Partners: Male      No current outpatient medications on file.  No current facility-administered medications for this visit.      Review of patient's allergies indicates:  No Known Allergies        Review of Systems   Constitutional: Negative for weight gain and weight loss.   Cardiovascular:  Negative for chest pain.   Respiratory:  Negative for shortness of breath.    Musculoskeletal:  Positive for back pain. Negative for joint pain and joint swelling.   Gastrointestinal:  Negative for abdominal pain and bowel incontinence.   Genitourinary:  Negative for bladder incontinence.   Neurological:  Negative for numbness.        Objective:     General: Lila CALLAWAY is well-developed, well-nourished, appears stated age, in no acute distress, alert and oriented to time, place and person.     General    Vitals reviewed.  Constitutional: She is oriented to person, place, and time. She appears well-developed and well-nourished.   HENT:   Head: Normocephalic and atraumatic.   Pulmonary/Chest: Effort normal.   Neurological: She is alert and oriented to person, place, and time.   Psychiatric: She has a normal mood and affect. Her behavior is normal. Judgment and thought content normal.     General Musculoskeletal Exam   Gait: normal     Right Ankle/Foot Exam     Tests   Heel Walk: able to perform  Tiptoe Walk: able to perform    Left Ankle/Foot Exam     Tests   Heel Walk: able to perform  Tiptoe Walk: able to perform  Back (L-Spine & T-Spine) / Neck (C-Spine) Exam     Back (L-Spine & T-Spine) Range of Motion    Extension:  20   Flexion:  90     Spinal Sensation   Right Side Sensation  C-Spine Level: normal   L-Spine Level: normal  S-Spine Level: normal  Left Side Sensation  C-Spine Level: normal  L-Spine Level: normal  S-Spine Level: normal    Back (L-Spine & T-Spine) Tests   Right Side Tests  Straight leg raise:        Sitting SLR: > 70 degrees    Left Side Tests  Straight leg raise:       Sitting SLR: > 70 degrees      Other   She has no scoliosis .  Spinal Kyphosis:  Absent    Comments:  Neg walt      Muscle Strength   Right Upper Extremity   Biceps: 5/5   Deltoid:  5/5  Triceps:  5/5  Wrist extension: 5/5   Finger Flexors:  5/5  Left Upper Extremity  Biceps: 5/5   Deltoid:  5/5  Triceps:  5/5  Wrist extension: 5/5   Finger Flexors:  5/5  Right Lower Extremity   Hip Flexion: 5/5   Quadriceps:  5/5   Anterior tibial:  5/5   EHL:  5/5  Left Lower Extremity   Hip Flexion: 5/5   Quadriceps:  5/5   Anterior tibial:  5/5   EHL:  5/5    Reflexes     Left Side  Biceps:  2+  Triceps:  2+  Brachioradialis:  2+  Achilles:  2+  Left Shannon's Sign:  Absent  Babinski Sign:  absent  Quadriceps:  2+    Right Side   Biceps:  2+  Triceps:  2+  Brachioradialis:  2+  Achilles:  2+  Right Shannon's Sign:  absent  Babinski Sign:  absent  Quadriceps:  2+    Vascular Exam     Right Pulses        Carotid:                  2+    Left Pulses        Carotid:                  2+          Assessment:     1. Chronic right-sided low back pain without sciatica         Plan:     Orders Placed This Encounter    Ambulatory referral/consult to [facility]     The patient has had a history of low back pain with limitations in there activities of Daily living    Previous treatment has not provided relief.    The situation was discussed at length with the patient.  We discussed different causes of back pain and different treatment options.  We discussed the importance of stretching and strengthening.  We discussed posture.  We discussed the pros  and cons of further diagnostic testing, alternative treatment and Medications    Based on the history, physical exam, and functional index, an active physical therapy program is recommended.  The goal is to restore the patients function and reduce pain.  A program of progressive resistance exercises, biomechanical, and mobility maneuvers, instructions in proper body mechanics, aerobic conditioning and HEP will be utilized. The program will continue as long as making improvements.    An assessment of patients progress will be made at each visit to document change in status.    The patient will be actively involved in their own treatment, and responsible for appointments and home program    The patient's lumbar isometric strength will be tested and they will be placed in a program of isolated strength training based on 50% of their total functional torque and advanced as clinically appropriate.      Directional preference of pain will further influence the patients active rehabilitation program    The patient was instructed there might be an initial increase in discomfort    They are enrolled with good prognosis  Pattern 1      Follow-up: No follow-ups on file. If there are any questions prior to this, the patient was instructed to contact the office.

## 2023-12-06 ENCOUNTER — CLINICAL SUPPORT (OUTPATIENT)
Dept: REHABILITATION | Facility: HOSPITAL | Age: 53
End: 2023-12-06
Payer: COMMERCIAL

## 2023-12-06 ENCOUNTER — CLINICAL SUPPORT (OUTPATIENT)
Dept: REHABILITATION | Facility: HOSPITAL | Age: 53
End: 2023-12-06
Attending: PHYSICAL MEDICINE & REHABILITATION
Payer: COMMERCIAL

## 2023-12-06 VITALS
HEIGHT: 62 IN | SYSTOLIC BLOOD PRESSURE: 150 MMHG | BODY MASS INDEX: 26.87 KG/M2 | HEART RATE: 80 BPM | DIASTOLIC BLOOD PRESSURE: 96 MMHG | WEIGHT: 146 LBS

## 2023-12-06 DIAGNOSIS — G89.29 CHRONIC RIGHT-SIDED LOW BACK PAIN WITHOUT SCIATICA: ICD-10-CM

## 2023-12-06 DIAGNOSIS — Z76.89 SEEN BY HEALTH AND WELLBEING COACH: Primary | ICD-10-CM

## 2023-12-06 DIAGNOSIS — M54.50 CHRONIC RIGHT-SIDED LOW BACK PAIN WITHOUT SCIATICA: ICD-10-CM

## 2023-12-06 PROCEDURE — 97750 PHYSICAL PERFORMANCE TEST: CPT | Mod: 32 | Performed by: PHYSICAL MEDICINE & REHABILITATION

## 2023-12-06 RX ORDER — IBUPROFEN 200 MG
200 TABLET ORAL EVERY 6 HOURS PRN
COMMUNITY

## 2023-12-06 NOTE — PROGRESS NOTES
Health  met with patient to complete initial outcomes for the Healthy Back lumbar program.  Questions were reviewed with patient and discussed with Dr. Bejarano. The patient will meet with Dr. Bejarano to determine program enrollment.         12/6/2023     9:39 AM   HealthyBack Questionnaire    Location of your worst pain: Lower Back   Please select the location of any additional pain: (hold down the control key, and click to select multiple responses) Leg- Left    Did the pain begin after an event, injury, or accident? No   How long has this pain been going on?  3-4 years   Please indicate how the pain is changing.  Worsening   What is the WORST level of pain that you have experienced in the last week?  5   What is the LEAST level of pain that you have experienced in the past week?  0   What can you NOT do not that you used to be able to do?  exercise regularly   Are your limitations mainly due to your pain?  Yes   What are your additional complaints, if any? Burning   Is there ever a time during the day when your pain stops, even for a brief moment, before returning? Yes   Does bending forward make your typical pain worse? Yes   Morning: Better during   Afternoon: Better during   Evening:  Better during   Nighttime: Worse during   Standing:  Worse   Lying on stomach: Worse   Lying on back: Worse   Sitting:  Worse   Walking: Better   Climbing stairs: Worse   Emergency department  No   Health care providers (hold down the control key, and click to select multiple responses) Family doctor   Investigations done (hold down the control key, and click to select multiple responses) None   Procedures (hold down the control key, and click to select multiple responses) None   Have you had any surgery on your back?  No   Please ave what you are experiencing. 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 College   What is your work status? Employed   How did you hear about the Healthyback program?  Patient referral

## 2024-01-04 ENCOUNTER — CLINICAL SUPPORT (OUTPATIENT)
Dept: REHABILITATION | Facility: HOSPITAL | Age: 54
End: 2024-01-04
Attending: PHYSICAL MEDICINE & REHABILITATION
Payer: COMMERCIAL

## 2024-01-04 DIAGNOSIS — M54.50 CHRONIC RIGHT-SIDED LOW BACK PAIN WITHOUT SCIATICA: ICD-10-CM

## 2024-01-04 DIAGNOSIS — R29.898 DECREASED STRENGTH OF TRUNK AND BACK: Primary | ICD-10-CM

## 2024-01-04 DIAGNOSIS — G89.29 CHRONIC RIGHT-SIDED LOW BACK PAIN WITHOUT SCIATICA: ICD-10-CM

## 2024-01-04 PROCEDURE — 97750 PHYSICAL PERFORMANCE TEST: CPT | Mod: 32

## 2024-01-08 PROBLEM — R29.898 DECREASED STRENGTH OF TRUNK AND BACK: Status: ACTIVE | Noted: 2024-01-08

## 2024-01-08 NOTE — PLAN OF CARE
[facility]  Physical Therapy Lumbar Evaluation      Name: Lila Pineda  Clinic Number: [id number]    Therapy Diagnosis:   Encounter Diagnoses   Name Primary?    Chronic right-sided low back pain without sciatica     Decreased strength of trunk and back Yes     Physician: Thanh Galdamez MD    Physician Orders: PT Eval and Treat  Medical Diagnosis from Referral: M54.50,G89.29 (ICD-10-CM) - Chronic right-sided low back pain without sciatica   Evaluation Date: 1/4/2024  Authorization Period Expiration: 12/5/2024  Plan of Care Expiration: 4/4/2024  Reassessment Due: 2/4/2024  Visit # / Visits authorized: 1/1 (pending)  MedX testing visit 2    Time In: 12:35 PM  Time Out: 1:30 PM  Total Billable Time: 55 minutes  INSURANCE and OUTCOMES: Program Benefit Group with Lumbar Outcomes (Oswestry and AQoL) 1/3    Precautions: standard    Pattern of pain determined: 1/movement responder     Subjective   Date of onset: chronic condition    History of current condition: Lila reports histroy of low back pain for about 3 years and states it has progressively worsened in the last few months. She reports the pain is worse on her Right side and at times it radiates into her mid back. She describes pain as achy, throbbing, and stiffness. She denies radicular symptoms. She is a nurse at [employer]. Aggravating factors include bending, lifting, sweeping, mopping, assisting patients with transfers, pushing patients in wheelchair. Pain is typically worse at the end of her work day. She currently works three 12 hour shifts per week.       Medical History:   Past Medical History:   Diagnosis Date    Perimenopausal     Shingles      Surgical History:   Lila Pineda  has a past surgical history that includes breast reduction  (Right); Colonoscopy (N/A, 12/20/2021); and Breast surgery (Bilateral).    Medications:   Lila CALLAWAY has a current medication list which  includes the following prescription(s): ibuprofen.    Allergies:   Review of patient's allergies indicates:  No Known Allergies     Imaging: none on file     Prior Therapy: yes  Prior Treatment: chiropractor   Social History:   Occupation: Nurse at [employer]   Leisure: going to park, spending time with family, would like to get back to working out       Prior Level of Function: no limitations  Current Level of Function: difficulty with sweeping, mopping, bending, assisting patients  DME owned/used: [employer] Membership: [employer]    Pain:  Current 3/10, worst 5/10, best 0/10   Location: low back  Description: achy, throbbing, stiff  Aggravating Factors: prolonged positions, bending, pushing patients in wheelchair, sweeping, mopping  Easing Factors: ice, ibuprofen, hot bath    Disturbed Sleep: not recently     Pattern of pain questions:  1.  Where is your pain the worst? Right low back   2.  Is your pain constant or intermittent? Intermittent   3.  Does bending forward make your typical pain worse? Yes  4.  Since the start of your back pain, has there been a change in your bowel or bladder? No  5.  What can't you do now that you use to be able to do? Working out without pain, mopping, sweeping     Pts goals: perform job duties without pain, household chores without pain, return to working out     Red Flag Screening:   Cough/Sneeze Strain: (--)  Bladder/Bowel: (--)  Falls: (--)  Night pain: (--)  Unexplained weight loss: (--)  General health: good    Objective    Postural examination/scapula alignment: Rounded shoulder and Slouched posture  Joint integrity: Firm end feeling  Correction of posture: better with lumbar roll  Sitting: slouched  Standing: WNL    MOVEMENT LOSS - Lumbar   Norms ROM Loss Initial   Flexion Fingers touch toes, sacral angle >/= 70 deg, uniform spinal curvature, posterior weight shift  within functional limits   Extension ASIS surpasses toes, spine of scapulae surpasses  heels, uniform spinal curve minimal loss   Side glide Right  within functional limits   Side glide Left  within functional limits   Rotation Right PT observes contralateral shoulder minimal loss   Rotation Left PT observes contralateral shoulder minimal loss     Lower Extremity Strength  Right LE  Left LE    Hip flexion: 4+/5 Hip flexion: 5/5   Hip extension:  4+/5 Hip extension: 4+/5   Hip abduction: 4/5 Hip abduction: 4+/5   Knee Flexion 5/5 Knee Flexion 5/5   Knee Extension 5/5 Knee Extension 5/5   Ankle dorsiflexion: 5/5 Ankle dorsiflexion: 5/5   Ankle plantarflexion: 5/5 Ankle plantarflexion: 5/5     GAIT:  Assistive Device used: none  Level of Assistance: independent  Patient displays the following gait deviations: no gait deviations observed.     Special Tests:   Test Name  Test Result   Prone Instability Test (--)   SI Joint Provocation Test (--)   Straight Leg Raise (--)   Neural Tension Test NT   Crossed Straight Leg Raise (--)   Walking on toes Able   Walking on heels  Able     NEUROLOGICAL SCREENING:     Sensory deficits: Intact to light touch B LE    Reflexes:    Left Right   Patella Tendon 2+ 2+   Achilles Tendon 2+ 2+   Clonus (--) (--)     REPEATED TEST MOVEMENTS:    Baseline symptoms:  Repeated Flexion in Standing no worse  no better   Repeated Extension in Standing better   Repeated Flexion in lying better   Repeated Extension in lying  better     STATIC TESTS and other movements:   Prone lie no worse  no better   Prone lie on elbows better   Sitting slouched  no worse  no better   Sitting erect no worse  no better   Standing slouched better   Standing erect  better   Lying prone in extension  no worse  no better   Long sitting   NT   Sustained flexion better   Sustained prone using mat no worse  no better     Lumbar testing Visit 2    OUTCOMES SELECTION:   Program Patient Outcome Measures    Oswestry Score:  11/50 = 22% disability     AQoL Score:  3/36 = 8% disability          Treatment     Total  Treatment time separate from Evaluation: 10 minutes    Lila CALLAWAY received therapeutic exercises to develop/improve posture, lumbar ROM, strength, and muscular endurance for 10 minutes including the following exercises:     Written Home Exercises Provided: yes.    HEP AS FOLLOWS:    LTR  Open book  Bridges  Clamshells  Extension in lying  Extension in standing     Exercises were reviewed and Lila was able to demonstrate them prior to the end of the session. Lila demonstrated good  understanding of the education provided.     See EMR under Patient Instructions for exercises provided 1/4/2024.    Lila CALLAWAY received the following manual therapy techniques:  were applied to the:  for 00 minutes.     MedX Testing:  MedX testing to be performed next visit    Therapeutic Education/Activity provided for 5 minutes:   - Patient was given an [facility] Visit 1 handout which discusses the following:  - what to expect in therapy  - an overview of the program, including health coaching and wellness  - importance of spinal hygiene, proper posture, lifting mechanics, sleep quality, and nutrition/hydration   - Ammon roll trialed, recommended, and purchase information was provided.  - Patient received a handout regarding anticipated muscular soreness following the isometric test and strategies for management were reviewed with patient including stretching, using ice and scheduled rest.   - Patient received verbal education on the following:   - Healthy Back program   - purpose of the isometric test  - safe progression of lumbar strengthening, wellness approach, and systemic strengthening.   - safe usage of MedX machine and testing protocols.    Lila received cold pack for 00 minutes to  in [place].    Assessment   Lila CALLAWAY is a 53 y.o. female referred to [facility] with a medical diagnosis of M54.50,G89.29 (ICD-10-CM) - Chronic right-sided low back pain without sciatica . Upon physical assessment, pt demonstrates  slouched posture in sitting, mild hip weakness bilaterally, and trunk and hip mobility deficits.  All of the above noted supports potential lumbar classification as a pattern 1/movement responder with recurrent/or consistent symptoms, thus pt is a good candidate for the Healthy Back Program. Pt would benefit from LE and trunk mobility training, stability training,  improved cardiovascular and muscular endurance, neuromuscular re-education for posture, coordination, and muscular recruitment and education on positional offloading techniques to decrease the intensity and frequency of flare-ups.      Pain Pattern: 1/movement responder       Pt prognosis is Excellent.     Pt will benefit from skilled outpatient Physical Therapy to address the deficits stated above and in the chart below, to provide pt/family education, and to maximize pt's level of independence. Based on the above history and physical examination an active physical therapy program is recommended.      Plan of care discussed with patient: Yes  Pt's spiritual, cultural and educational needs considered and patient is agreeable to the plan of care and goals as stated below:     Anticipated Barriers for therapy: none    PT Evaluation Completed? Yes    Medical necessity is demonstrated by the following problem list:    History  Co-morbidities and personal factors that may impact the plan of care [] LOW: no personal factors / co-morbidities  [x] MODERATE: 1-2 personal factors / co-morbidities  [] HIGH: 3+ personal factors / co-morbidities    Moderate / High Support Documentation:   Co-morbidities affecting plan of care:     Personal Factors:   age     Examination  Body Structures and Functions, activity limitations and participation restrictions that may impact the plan of care [] LOW: addressing 1-2 elements  [x] MODERATE: 3+ elements  [] HIGH: 4+ elements (please support below)    Moderate / High Support Documentation: minimal loss lumbar ROM, B hip weakness,  poor posture     Clinical Presentation [x] LOW: stable  [] MODERATE: Evolving  [] HIGH: Unstable     Decision Making/ Complexity Score: low         GOALS: Pt is in agreement with the following goals.    Short term goals:  6 weeks or 10 visits   - Pt will demonstrate increased lumbar MedX ROM by at least 3 degrees from the initial ROM value with improvements noted in functional ROM and ability to perform ADLs. Appropriate and Ongoing  - Pt will demonstrate increased MedX average isometric strength value by 25% from initial test resulting in improved ability to perform bending, lifting, and carrying activities safely, confidently. Appropriate and Ongoing  - Pt will report a reduction in worst pain score by 1-2 points for improved tolerance for bending activity. Appropriate and Ongoing  - Pt able to perform HEP correctly with minimal cueing or supervision from therapist to encourage independent management of symptoms. Appropriate and Ongoing    Long term goals: 10 weeks or 20 visits   - Pt will demonstrate increased lumbar MedX ROM by at least 6 degrees from initial ROM value, resulting in improved ability to perform functional forward bending while standing and sitting. Appropriate and Ongoing  - Pt will demonstrate increased MedX average isometric strength value by 50% from initial test resulting in improved ability to perform bending, lifting, and carrying activities safely and confidently. Appropriate and Ongoing  - Pt to demonstrate ability to independently control and reduce their pain through posture positioning and mechanical movements throughout a typical day. Appropriate and Ongoing  - Pt will demonstrate reduced pain and improved functional outcomes as reported on the Oswestry Disability Index by reaching a score of 10% or less in order to demonstrate subjective improvement in pt's condition. . Appropriate and Ongoing  - Pt will demonstrate independence with the HEP at discharge. Appropriate and Ongoing  -  Pt will be able to work out without increase in back symptoms. (patient goal) Appropriate and Ongoing    Plan     Outpatient physical therapy 2x week for 10 weeks or 20 visits to include the following:   - Patient education  - Therapeutic exercise  - Manual therapy  - Performance testing   - Neuromuscular Re-education  - Therapeutic activity   - Modalities    Pt may be seen by PTA as part of the rehabilitation team.     Therapist: Kelly Phoenix, PT  1/8/2024

## 2024-01-09 ENCOUNTER — CLINICAL SUPPORT (OUTPATIENT)
Dept: REHABILITATION | Facility: HOSPITAL | Age: 54
End: 2024-01-09
Payer: COMMERCIAL

## 2024-01-09 DIAGNOSIS — R29.898 DECREASED STRENGTH OF TRUNK AND BACK: Primary | ICD-10-CM

## 2024-01-09 PROCEDURE — 97750 PHYSICAL PERFORMANCE TEST: CPT | Mod: 32 | Performed by: PHYSICAL MEDICINE & REHABILITATION

## 2024-01-09 NOTE — PROGRESS NOTES
[facility]  Physical Therapy Treatment Note     Name: Lila Pineda  Clinic Number: [id number]    Therapy Diagnosis:   Encounter Diagnosis   Name Primary?    Decreased strength of trunk and back Yes     Physician: Tammie Bejarano, *    Visit Date: 1/9/2024       Physician Orders: PT Eval and Treat  Medical Diagnosis from Referral: M54.50,G89.29 (ICD-10-CM) - Chronic right-sided low back pain without sciatica   Evaluation Date: 1/4/2024  Authorization Period Expiration: 12/5/2024  Plan of Care Expiration: 4/4/2024  Reassessment Due: 2/4/2024  Visit # / Visits authorized: 2/20  (employee benefit )  MedX testing visit 2     Time In: 4:00  PM  Time Out: 5:00 PM  Total Billable Time: 55 minutes  INSURANCE and OUTCOMES: Program Benefit Group with Lumbar Outcomes (Oswestry and AQoL) 1/3     Precautions: standard     Pattern of pain determined: 1/movement responder      Subjective     Lila reports histroy of low back pain for about 3 years and states it has progressively worsened in the last few months. She reports the pain is worse on her Right side and at times it radiates into her mid back. She describes pain as achy, throbbing, and stiffness. She denies radicular symptoms. 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  She likes the stretches and report they help    Patient reports tolerating previous visit well  Patient reports their pain to be 3/10 on a 0-10 scale with 0 being no pain and 10 being the worst pain imaginable.  Pain Location: low back     Occupation: Nurse at [employer]   Leisure: going to park, spending time with family, would like to get back to working out , 3 adult children    Pt goals: perform job duties without pain, household chores without pain, return to working out     Objective      Lumbar  Isometric Testing on Med X equipment: Testing administered by PT    Test Initial Baseline Midpoint Final    Date 1/9/24     ROM 6-42 deg     Max Peak Torque 97      Min Peak Torque 47      Flex/Ext Ratio 2.1/1     % below normative data -46%     % gain from initial test Not available visit 1         MOVEMENT LOSS - Lumbar    Norms ROM Loss    Flexion Fingers touch toes, sacral angle >/= 70 deg, uniform spinal curvature, posterior weight shift  within functional limits   Extension ASIS surpasses toes, spine of scapulae surpasses heels, uniform spinal curve minimal loss   Side glide Right   within functional limits   Side glide Left   within functional limits   Rotation Right PT observes contralateral shoulder minimal loss   Rotation Left PT observes contralateral shoulder minimal loss          OUTCOMES SELECTION:   Program Patient Outcome Measures     Oswestry Score:  11/50 = 22% disability      AQoL Score:  3/36 = 8% disability         Treatment     Lila received the treatments listed below:      Medical MedX Treatment as follows:  MedX testing performed day 2: Patient  received neuromuscular education to engage spinal musculature correctly for motor control and engagement of musculature for 30 minutes including the MedX exercise component and practice and standard testing. MedX dynamic exercise and baseline isometric test performed with instructions to guide the patient safely through the testing procedure. Patient instructed to perform isometric test correctly and safely while building to an optimal force with a pain-free effort. Patient also instructed that they should feel support/pressure from MedX restraints but no pain/discomfort, and encouraged to report any pain to therapist. Patient demonstrated appropriate understanding of information and tolerance of test.  Education regarding purpose of test, safety during test given, and reviewed possible more soreness and strategies.            1/9/2024     5:12 PM   [facility]   Visit Number 2   VAS Pain Rating 3   Treadmill Time (in min.) 5 min   Lumbar  Extension Seat Pad 1   Femur Restraint 5   Top Dead Center 24   Counterweight 150   Lumbar Flexion 42   Lumbar Extension 6   Lumbar Peak Torque 97 ft. lbs.   Min Torque 47   Test Percent Below Normative Data 46 %   Ice - Z Lie (in min.) 5      Lila participated in therapeutic exercises to develop strength, endurance, ROM, flexibility, posture, and core stabilization for 45 minutes including:      LTR X 10  Open book X 10  Bridges X 10  Clamshells X 10 with 10 sec hold on last one (use band next visit)  Extension in lying X 10 (cuing not to use gluts)  Extension in standing  X 10    Peripheral muscle strengthening which included one set of 15-20 repetitions at a slow and controlled 10-13 second per rep pace focused on strengthening supporting musculature in order to improve body mechanics and functional mobility. Patient and therapist focused on proper form during treatment to ensure optimal strengthening of each targeted muscle group.  Machines utilized included:Torso rotation, Leg Ext, Hip Abd, Hip Add, and Leg Press  To be added next visit:Leg Curl, Chest Press, Rowing, Triceps, and Biceps      Lila participated in dynamic functional therapeutic activities to improve functional performance and simulate household and community activities for 0  minutes. The following activities were included:      Lila received manual therapy techniques for 0  minutes. The following activities were included:      Pt given cold pack for 5 minutes to low back in z lie.    Patient Education and Home Exercises     Home exercises include:  LTR X 10  Open book X 10  Bridges X 10  Clamshells X 10   Extension in lying X 10   Extension in standing  X 10  Cardio program (V5): -  Lifting education (V11): -  Posture/Lumbar roll: not yet, reminded her of value and ice packs 1/9/24  Fridge Magnet Discharge handout (date given): -  Equipment at home/gym membership: [employer]    Education provided:   - PT role and POC  - HEP  - protocol  healthy back  -safe testing medx  -getting lumbar roll and ice pack and Z lie after shifts with ice    Written Home Exercises Provided: yes.  Exercises were reviewed and Lila was able to demonstrate them prior to the end of the session.  Lila demonstrated good  understanding of the education provided.     See EMR under Patient Instructions for exercises provided prior visit.    Assessment     Lila presents to second healthy back visit reporting reduced pain with stretching and compliance, was able to demo HEP with Min VC for form. Pt was able to tolerate Medical MedX machine well as follows:  MedX testing performed and patient tolerated test well.  Pt was also able to complete half of the peripheral strengthening exercises without increased discomfort and will complete the complete circuit next visit as tolerated.    Patient is making good progress towards established goals.  Pt will continue to benefit from skilled outpatient physical therapy to address the deficits stated in the impairment chart, provide pt/family education and to maximize pt's level of independence in the home and community environment.     Anticipated Barriers for therapy: nil  Pt's spiritual, cultural and educational needs considered and pt agreeable to plan of care and goals as stated below:       Short term goals:  6 weeks or 10 visits   - Pt will demonstrate increased lumbar MedX ROM by at least 3 degrees from the initial ROM value with improvements noted in functional ROM and ability to perform ADLs. Appropriate and Ongoing  - Pt will demonstrate increased MedX average isometric strength value by 25% from initial test resulting in improved ability to perform bending, lifting, and carrying activities safely, confidently. Appropriate and Ongoing  - Pt will report a reduction in worst pain score by 1-2 points for improved tolerance for bending activity. Appropriate and Ongoing  - Pt able to perform HEP correctly with minimal cueing or  supervision from therapist to encourage independent management of symptoms. Appropriate and Ongoing     Long term goals: 10 weeks or 20 visits   - Pt will demonstrate increased lumbar MedX ROM by at least 6 degrees from initial ROM value, resulting in improved ability to perform functional forward bending while standing and sitting. Appropriate and Ongoing  - Pt will demonstrate increased MedX average isometric strength value by 50% from initial test resulting in improved ability to perform bending, lifting, and carrying activities safely and confidently. Appropriate and Ongoing  - Pt to demonstrate ability to independently control and reduce their pain through posture positioning and mechanical movements throughout a typical day. Appropriate and Ongoing  - Pt will demonstrate reduced pain and improved functional outcomes as reported on the Oswestry Disability Index by reaching a score of 10% or less in order to demonstrate subjective improvement in pt's condition. . Appropriate and Ongoing  - Pt will demonstrate independence with the HEP at discharge. Appropriate and Ongoing  - Pt will be able to work out without increase in back symptoms. (patient goal) Appropriate and Ongoing       Plan     Continue with established Plan of Care towards established PT goals.     Therapist: Heather Finch, PT  1/9/2024

## 2024-01-12 ENCOUNTER — CLINICAL SUPPORT (OUTPATIENT)
Dept: REHABILITATION | Facility: HOSPITAL | Age: 54
End: 2024-01-12
Payer: COMMERCIAL

## 2024-01-12 DIAGNOSIS — R29.898 DECREASED STRENGTH OF TRUNK AND BACK: Primary | ICD-10-CM

## 2024-01-12 PROCEDURE — 97750 PHYSICAL PERFORMANCE TEST: CPT | Mod: 32

## 2024-01-12 NOTE — PROGRESS NOTES
[facility]  Physical Therapy Treatment Note     Name: Lila Pineda  Clinic Number: [id number]    Therapy Diagnosis:   Encounter Diagnosis   Name Primary?    Decreased strength of trunk and back Yes     Physician: Tammie Bejarano, *    Visit Date: 1/12/2024    Physician Orders: PT Eval and Treat  Medical Diagnosis from Referral: M54.50,G89.29 (ICD-10-CM) - Chronic right-sided low back pain without sciatica   Evaluation Date: 1/4/2024  Authorization Period Expiration: 12/5/2024  Plan of Care Expiration: 4/4/2024  Reassessment Due: 2/4/2024  Visit # / Visits authorized: 3/20 (employee benefit )  MedX testing visit 2     Time In: 9:10 AM (pt with late arrival)  Time Out: 10:00 AM  Total Billable Time: 45 minutes  INSURANCE and OUTCOMES: Program Benefit Group with Lumbar Outcomes (Oswestry and AQoL) 1/3     Precautions: standard     Pattern of pain determined: 1/movement responder      Subjective   Lila reports she felt good after first follow up visit. She reports minimal low back pain/stiffness currently.     Patient reports tolerating previous visit well  Patient reports their pain to be 2/10 on a 0-10 scale with 0 being no pain and 10 being the worst pain imaginable.  Pain Location: low back     Occupation: Nurse at [employer]   Leisure: going to park, spending time with family, would like to get back to working out , 3 adult children    Pt goals: perform job duties without pain, household chores without pain, return to working out     Objective      Lumbar  Isometric Testing on Med X equipment: Testing administered by PT    Test Initial Baseline Midpoint Final   Date 1/9/24     ROM 6-42 deg     Max Peak Torque 97      Min Peak Torque 47      Flex/Ext Ratio 2.1/1     % below normative data -46%     % gain from initial test Not available visit 1       MOVEMENT LOSS - Lumbar    Norms ROM Loss    Flexion Fingers touch toes, sacral angle >/= 70 deg, uniform  spinal curvature, posterior weight shift  within functional limits   Extension ASIS surpasses toes, spine of scapulae surpasses heels, uniform spinal curve minimal loss   Side glide Right   within functional limits   Side glide Left   within functional limits   Rotation Right PT observes contralateral shoulder minimal loss   Rotation Left PT observes contralateral shoulder minimal loss      OUTCOMES SELECTION:   Program Patient Outcome Measures     Oswestry Score:  11/50 = 22% disability      AQoL Score:  3/36 = 8% disability         Treatment     Lila received the treatments listed below:      Medical MedX Treatment as follows:  MedX testing performed day 2: Patient  received neuromuscular education to engage spinal musculature correctly for motor control and engagement of musculature for 10 minutes including the MedX exercise component and practice and standard testing. MedX dynamic exercise and baseline isometric test performed with instructions to guide the patient safely through the testing procedure. Patient instructed to perform isometric test correctly and safely while building to an optimal force with a pain-free effort. Patient also instructed that they should feel support/pressure from MedX restraints but no pain/discomfort, and encouraged to report any pain to therapist. Patient demonstrated appropriate understanding of information and tolerance of test.  Education regarding purpose of test, safety during test given, and reviewed possible more soreness and strategies.            1/12/2024     9:12 AM   [facility]   Visit Number 3   VAS Pain Rating 2   Extension in Lying 10   Extension in Standing 10   Lumbar Weight 45 lbs   Repetitions 15   Rating of Perceived Exertion 3   Ice - Z Lie (in min.) 5     Lila participated in therapeutic exercises to develop strength, endurance, ROM, flexibility, posture, and core stabilization for 35 minutes including:    LTR x 10  Open book x 10  +PPT + BKFO x10    Bridges with 3 sec hold c/ RTB x 10  Clamshells c/ RTB x 10 with 10 sec hold on last one   Extension in lying x 10 (cuing not to use gluts)  Extension in standing  x 10    Peripheral muscle strengthening which included one set of 15-20 repetitions at a slow and controlled 10-13 second per rep pace focused on strengthening supporting musculature in order to improve body mechanics and functional mobility. Patient and therapist focused on proper form during treatment to ensure optimal strengthening of each targeted muscle group.  Machines utilized included:Torso rotation, Leg Ext, Hip Abd, Hip Add, and Leg Press  To be added next visit:Leg Curl, Chest Press, Rowing, Triceps, and Biceps      Lila participated in dynamic functional therapeutic activities to improve functional performance and simulate household and community activities for 0  minutes. The following activities were included:      Lila received manual therapy techniques for 0  minutes. The following activities were included:      Pt given cold pack for 5 minutes to low back in z lie.    Patient Education and Home Exercises   Home exercises include:  LTR X 10  Open book X 10  Bridges X 10  Clamshells X 10   Extension in lying X 10   Extension in standing  X 10  Cardio program (V5): -  Lifting education (V11): -  Posture/Lumbar roll: not yet, reminded her of value and ice packs 1/9/24  Fridge [employer] Discharge handout (date given): -  Equipment at home/gym membership: [employer]    Education provided:   - PT role and POC  - HEP  - protocol healthy back  -safe testing medx  -getting lumbar roll and ice pack and Z lie after shifts with ice    Written Home Exercises Provided: yes.  Exercises were reviewed and Lila was able to demonstrate them prior to the end of the session.  Lila demonstrated good  understanding of the education provided.     See EMR under Patient Instructions for exercises provided prior visit.    Assessment   Lila presents to [facility]  healthy back visit reporting mild back pain currently. Treatment continued with lumbopelvic mobility, strengthening, and neuro re-education exercises. Progressed clamshells and bridges by adding RTB which she tolerated well. Also added PPT + BKFO for core strengthening. Pt was able to tolerate Medical MedX machine well as follows:  MedX exercise started  and patient tolerated  neuro reeducation training, strengthening, and endurance training on the lumbar MedX at 50% of max peak torque according to the initial visit isometric test. Pt was able to complete 15 reps, with 3/10 RPE.  Pt was also able to complete all peripheral strengthening exercises without increased discomfort . Will progress treatment per HB protocol and pt's tolerance.     Patient is making good progress towards established goals.  Pt will continue to benefit from skilled outpatient physical therapy to address the deficits stated in the impairment chart, provide pt/family education and to maximize pt's level of independence in the home and community environment.     Anticipated Barriers for therapy: nil  Pt's spiritual, cultural and educational needs considered and pt agreeable to plan of care and goals as stated below:       Short term goals:  6 weeks or 10 visits   - Pt will demonstrate increased lumbar MedX ROM by at least 3 degrees from the initial ROM value with improvements noted in functional ROM and ability to perform ADLs. Appropriate and Ongoing  - Pt will demonstrate increased MedX average isometric strength value by 25% from initial test resulting in improved ability to perform bending, lifting, and carrying activities safely, confidently. Appropriate and Ongoing  - Pt will report a reduction in worst pain score by 1-2 points for improved tolerance for bending activity. Appropriate and Ongoing  - Pt able to perform HEP correctly with minimal cueing or supervision from therapist to encourage independent management of symptoms. Appropriate  and Ongoing     Long term goals: 10 weeks or 20 visits   - Pt will demonstrate increased lumbar MedX ROM by at least 6 degrees from initial ROM value, resulting in improved ability to perform functional forward bending while standing and sitting. Appropriate and Ongoing  - Pt will demonstrate increased MedX average isometric strength value by 50% from initial test resulting in improved ability to perform bending, lifting, and carrying activities safely and confidently. Appropriate and Ongoing  - Pt to demonstrate ability to independently control and reduce their pain through posture positioning and mechanical movements throughout a typical day. Appropriate and Ongoing  - Pt will demonstrate reduced pain and improved functional outcomes as reported on the Oswestry Disability Index by reaching a score of 10% or less in order to demonstrate subjective improvement in pt's condition. . Appropriate and Ongoing  - Pt will demonstrate independence with the HEP at discharge. Appropriate and Ongoing  - Pt will be able to work out without increase in back symptoms. (patient goal) Appropriate and Ongoing       Plan     Continue with established Plan of Care towards established PT goals.     Therapist: Kelly Phoenix, PT  1/12/2024

## 2024-01-23 ENCOUNTER — CLINICAL SUPPORT (OUTPATIENT)
Dept: REHABILITATION | Facility: HOSPITAL | Age: 54
End: 2024-01-23
Payer: COMMERCIAL

## 2024-01-23 DIAGNOSIS — R29.898 DECREASED STRENGTH OF TRUNK AND BACK: Primary | ICD-10-CM

## 2024-01-23 PROCEDURE — 97750 PHYSICAL PERFORMANCE TEST: CPT | Mod: 32

## 2024-01-23 NOTE — PROGRESS NOTES
[facility]  Physical Therapy Treatment Note     Name: Lila Pineda  Clinic Number: [id number]    Therapy Diagnosis:   Encounter Diagnosis   Name Primary?    Decreased strength of trunk and back Yes     Physician: Tammie Bejarano, *    Visit Date: 1/23/2024    Physician Orders: PT Eval and Treat  Medical Diagnosis from Referral: M54.50,G89.29 (ICD-10-CM) - Chronic right-sided low back pain without sciatica   Evaluation Date: 1/4/2024  Authorization Period Expiration: 12/5/2024  Plan of Care Expiration: 4/4/2024  Reassessment Due: 2/4/2024  Visit # / Visits authorized: 4/20 (employee benefit )  MedX testing visit 2     Time In: 9:00 AM    Time Out: 9:55 AM  Total Billable Time: 55 minutes  INSURANCE and OUTCOMES: Program Benefit Group with Lumbar Outcomes (Oswestry and AQoL) 1/3     Precautions: standard     Pattern of pain determined: 1/movement responder      Subjective   Lila reports chronic lower back discomfort/stiffness that is generally worse in the mornings. States that she has been trying to do her ex's in the morning which has been helpful..    Patient reports tolerating previous visit well  Patient reports their pain to be 2/10 on a 0-10 scale with 0 being no pain and 10 being the worst pain imaginable.  Pain Location: low back     Occupation: Nurse at [employer]   Leisure: going to park, spending time with family, would like to get back to working out , 3 adult children    Pt goals: perform job duties without pain, household chores without pain, return to working out     Objective      Lumbar  Isometric Testing on Med X equipment: Testing administered by PT    Test Initial Baseline Midpoint Final   Date 1/9/24     ROM 6-42 deg     Max Peak Torque 97      Min Peak Torque 47      Flex/Ext Ratio 2.1/1     % below normative data -46%     % gain from initial test Not available visit 1       MOVEMENT LOSS - Lumbar    Norms ROM Loss    Flexion Fingers  touch toes, sacral angle >/= 70 deg, uniform spinal curvature, posterior weight shift  within functional limits   Extension ASIS surpasses toes, spine of scapulae surpasses heels, uniform spinal curve minimal loss   Side glide Right   within functional limits   Side glide Left   within functional limits   Rotation Right PT observes contralateral shoulder minimal loss   Rotation Left PT observes contralateral shoulder minimal loss      OUTCOMES SELECTION:   Program Patient Outcome Measures     Oswestry Score:  11/50 = 22% disability      AQoL Score:  3/36 = 8% disability         Treatment     Lila received the treatments listed below:      Medical MedX Treatment as follows:  Lila received neuromuscular education  to isolate and engage spinal stabilization musculature correctly for motor control and coordination to aid in function and posture for 10 minutes on the Medical Medx Machine.  Patient performed MedX dynamic exercise with emphasis on spinal muscular control using pacer throughout  active range of motion. Therapist assisted patient in achieving optimal exertion for neural reeducation and endurance training by using the  Karon Exertion Rating scale, by instructing the patient to aim for mid range of exertion, performing 15-20 repetitions, slowly, correctly,and safely        1/23/2024     9:16 AM   [facility] - Short   Visit Number 4   VAS Pain Rating 2   Time 5   Extension in Lying 10   Extension in Standing 10   Flexion in Lying 10   Lumbar Flexion 42   Lumbar Extension 3   Lumbar Weight 45 lbs   Repetitions 18   Rating of Perceived Exertion 4      Lila participated in therapeutic exercises to develop strength, endurance, ROM, flexibility, posture, and core stabilization for 45 minutes including:    LTR x 10  Open book x 10  +DKTC x 10  PPT + BKFO RTB x10   Bridges with 3 sec hold c/ RTB x  15  Clamshells c/ RTB x 10 with 10 sec hold on last one   Extension in lying x 10 (cuing not to use  gluts)  Extension in standing  x 10    Peripheral muscle strengthening which included one set of 15-20 repetitions at a slow and controlled 10-13 second per rep pace focused on strengthening supporting musculature in order to improve body mechanics and functional mobility. Patient and therapist focused on proper form during treatment to ensure optimal strengthening of each targeted muscle group.  Machines utilized included:Torso rotation, Leg Ext, Hip Abd, Hip Add, and Leg Press:Leg Curl, Chest Press, Rowing, Triceps, and Biceps    Lila received manual therapy techniques for 0  minutes. The following activities were included:      Pt given cold pack for 5 minutes to low back in z lie.    Patient Education and Home Exercises   Home exercises include:  LTR X 10  Open book X 10  Bridges X 10  Clamshells X 10   Extension in lying X 10   Extension in standing  X 10  Cardio program (V5): -  Lifting education (V11): -  Posture/Lumbar roll: not yet, reminded her of value and ice packs 1/9/24  Fridge Magnet Discharge handout (date given): -  Equipment at home/gym membership: [employer]    Education provided:   - cues w/ex's  - MedX performance  - Precor ex performance    Written Home Exercises Provided: yes.  Exercises were reviewed and Lila was able to demonstrate them prior to the end of the session.  Lila demonstrated good  understanding of the education provided.     See EMR under Patient Instructions for exercises provided prior visit.    Assessment   Lila returns with mild lower back/stiffness that usually feels better with stretcing. Treatment continued with  mobility, strengthening, and neuro re-education exercises. Added DKTC stretch for additional flexibility, progressed reps for bridging with band and added RTB for BKFO.  She was able to perform ex's including progressions without c/o. Lumbar MedX extension ROM was increased to 3 degrees and resistance maintained at 45 ft/lbs and she completed 18 reps  with a RPE = 4/10. Min cues for MedX pacing and extension hold. She was able to complete the full circuit of peripheral strengthening ex's without c/o. Will continue to progress per HB protocol and patient tolerance.     Patient is making progress towards established goals.  Pt will continue to benefit from skilled outpatient physical therapy to address the deficits stated in the impairment chart, provide pt/family education and to maximize pt's level of independence in the home and community environment.     Anticipated Barriers for therapy: nil  Pt's spiritual, cultural and educational needs considered and pt agreeable to plan of care and goals as stated below:       Short term goals:  6 weeks or 10 visits   - Pt will demonstrate increased lumbar MedX ROM by at least 3 degrees from the initial ROM value with improvements noted in functional ROM and ability to perform ADLs. Appropriate and Ongoing  - Pt will demonstrate increased MedX average isometric strength value by 25% from initial test resulting in improved ability to perform bending, lifting, and carrying activities safely, confidently. Appropriate and Ongoing  - Pt will report a reduction in worst pain score by 1-2 points for improved tolerance for bending activity. Appropriate and Ongoing  - Pt able to perform HEP correctly with minimal cueing or supervision from therapist to encourage independent management of symptoms. Appropriate and Ongoing     Long term goals: 10 weeks or 20 visits   - Pt will demonstrate increased lumbar MedX ROM by at least 6 degrees from initial ROM value, resulting in improved ability to perform functional forward bending while standing and sitting. Appropriate and Ongoing  - Pt will demonstrate increased MedX average isometric strength value by 50% from initial test resulting in improved ability to perform bending, lifting, and carrying activities safely and confidently. Appropriate and Ongoing  - Pt to demonstrate ability to  independently control and reduce their pain through posture positioning and mechanical movements throughout a typical day. Appropriate and Ongoing  - Pt will demonstrate reduced pain and improved functional outcomes as reported on the Oswestry Disability Index by reaching a score of 10% or less in order to demonstrate subjective improvement in pt's condition. . Appropriate and Ongoing  - Pt will demonstrate independence with the HEP at discharge. Appropriate and Ongoing  - Pt will be able to work out without increase in back symptoms. (patient goal) Appropriate and Ongoing     Plan   Continue with established Plan of Care towards established PT goals.     Therapist: Daniele Lin, XOCHITL  1/23/2024

## 2024-01-31 ENCOUNTER — CLINICAL SUPPORT (OUTPATIENT)
Dept: REHABILITATION | Facility: HOSPITAL | Age: 54
End: 2024-01-31
Payer: COMMERCIAL

## 2024-01-31 DIAGNOSIS — R29.898 DECREASED STRENGTH OF TRUNK AND BACK: Primary | ICD-10-CM

## 2024-01-31 PROCEDURE — 97750 PHYSICAL PERFORMANCE TEST: CPT | Mod: 32

## 2024-01-31 NOTE — PROGRESS NOTES
[facility]  Physical Therapy Treatment Note     Name: Lila Pineda  Clinic Number: [id number]    Therapy Diagnosis:   Encounter Diagnosis   Name Primary?    Decreased strength of trunk and back Yes     Physician: Tammie Bejarano, *    Visit Date: 1/31/2024    Physician Orders: PT Eval and Treat  Medical Diagnosis from Referral: M54.50,G89.29 (ICD-10-CM) - Chronic right-sided low back pain without sciatica   Evaluation Date: 1/4/2024  Authorization Period Expiration: 12/5/2024  Plan of Care Expiration: 4/4/2024  Reassessment Due: 2/4/2024  Visit # / Visits authorized: 5/20 (employee benefit )  MedX testing visit 2     Time In: 9:00 AM    Time Out: 10:00 AM  Total Billable Time: 60 minutes  INSURANCE and OUTCOMES: Program Benefit Group with Lumbar Outcomes (Oswestry and AQoL) 1/3     Precautions: standard     Pattern of pain determined: 1/movement responder      Subjective   Lila reports chronic lower back discomfort/stiffness. States that ex's have been helpful to reduce her symptoms and usually feels better after treatments.      Patient reports tolerating previous visit : N0 c/o , feels better  Patient reports their pain to be 2/10 on a 0-10 scale with 0 being no pain and 10 being the worst pain imaginable.  Pain Location: low back     Occupation: Nurse at [employer]   Leisure: going to park, spending time with family, would like to get back to working out , 3 adult children    Pt goals: perform job duties without pain, household chores without pain, return to working out     Objective      Lumbar  Isometric Testing on Med X equipment: Testing administered by PT    Test Initial Baseline Midpoint Final   Date 1/9/24     ROM 6-42 deg     Max Peak Torque 97      Min Peak Torque 47      Flex/Ext Ratio 2.1/1     % below normative data -46%     % gain from initial test Not available visit 1       MOVEMENT LOSS - Lumbar    Norms ROM Loss    Flexion Fingers touch  toes, sacral angle >/= 70 deg, uniform spinal curvature, posterior weight shift  within functional limits   Extension ASIS surpasses toes, spine of scapulae surpasses heels, uniform spinal curve minimal loss   Side glide Right   within functional limits   Side glide Left   within functional limits   Rotation Right PT observes contralateral shoulder minimal loss   Rotation Left PT observes contralateral shoulder minimal loss      OUTCOMES SELECTION:   Program Patient Outcome Measures     Oswestry Score:  11/50 = 22% disability      AQoL Score:  3/36 = 8% disability         Treatment     Lila received the treatments listed below:      Medical MedX Treatment as follows:  Lila received neuromuscular education  to isolate and engage spinal stabilization musculature correctly for motor control and coordination to aid in function and posture for 10 minutes on the Medical Medx Machine.  Patient performed MedX dynamic exercise with emphasis on spinal muscular control using pacer throughout  active range of motion. Therapist assisted patient in achieving optimal exertion for neural reeducation and endurance training by using the  Karon Exertion Rating scale, by instructing the patient to aim for mid range of exertion, performing 15-20 repetitions, slowly, correctly,and safely        1/31/2024     9:21 AM   [facility] - Short   Visit Number 5   VAS Pain Rating 2   Time 5   Extension in Lying 10   Extension in Standing 10   Flexion in Lying 10   Lumbar Flexion 45   Lumbar Extension 3   Lumbar Weight 45 lbs   Repetitions 20   Rating of Perceived Exertion 4       Lila participated in therapeutic exercises to develop strength, endurance, ROM, flexibility, posture, and core stabilization for 50 minutes including:    LTR x 10  Open book x 10  DKTC x 10  +TrA + SLR x 10  PPT + BKFO RTB x10 --NP  Bridges with 3 sec hold c/GTB x  20  Clamshells c/ GTB x 15   Extension in lying x 10 (cuing not to use glutes)  Extension in  standing  x 10    Peripheral muscle strengthening which included one set of 15-20 repetitions at a slow and controlled 10-13 second per rep pace focused on strengthening supporting musculature in order to improve body mechanics and functional mobility. Patient and therapist focused on proper form during treatment to ensure optimal strengthening of each targeted muscle group.  Machines utilized included:Torso rotation, Leg Ext, Hip Abd, Hip Add, and Leg Press:Leg Curl, Chest Press, Rowing, Triceps, and Biceps    Lila received manual therapy techniques for 0  minutes. The following activities were included:      Pt given cold pack for 5 minutes to low back in z lie.    Patient Education and Home Exercises   Home exercises include:  LTR X 10  Open book X 10  Bridges X 10  Clamshells X 10   Extension in lying X 10   Extension in standing  X 10  Cardio program (V5): - 1/31/24   Lifting education (V11): -  Posture/Lumbar roll: not yet, reminded her of value and ice packs 1/9/24  Fridge Magnet Discharge handout (date given): -  Equipment at home/gym membership: [employer]    Education provided:   - cues w/ex's  - MedX performance  - Precor ex performance  - 1/31/24 Availability of Health coaching.    Written Home Exercises Provided: yes.  Exercises were reviewed and Lila was able to demonstrate them prior to the end of the session.  Lila demonstrated good  understanding of the education provided.     See EMR under Patient Instructions for exercises provided prior visit.    Assessment   Lila returns with mild lower back stiffness/discomfort.Treatment continued with mobility, strengthening, and neuro re-education exercises. She was progressed to perform TrA activation + SLR, GTB resistance for bridging with band and clamshells for progressive strengthening. She was able to perform ex's including progressions without c/o. She was also educated on the benefits of cardiovascular ex to which she reports plans to perform  more walking activities. She was also educated on the availability of Health coaching which she does express some interest and a visit was scheduled. Lumbar MedX Flexion ROM was increased to 45 degrees and resistance maintained at 45 ft/lbs and she completed 20 reps with a RPE = 4/10.  She was able to complete the full circuit of peripheral strengthening ex's without c/o. Will continue to progress per HB protocol and patient tolerance.     Patient is making progress towards established goals.  Pt will continue to benefit from skilled outpatient physical therapy to address the deficits stated in the impairment chart, provide pt/family education and to maximize pt's level of independence in the home and community environment.     Anticipated Barriers for therapy: nil  Pt's spiritual, cultural and educational needs considered and pt agreeable to plan of care and goals as stated below:       Short term goals:  6 weeks or 10 visits   - Pt will demonstrate increased lumbar MedX ROM by at least 3 degrees from the initial ROM value with improvements noted in functional ROM and ability to perform ADLs. Appropriate and Ongoing  - Pt will demonstrate increased MedX average isometric strength value by 25% from initial test resulting in improved ability to perform bending, lifting, and carrying activities safely, confidently. Appropriate and Ongoing  - Pt will report a reduction in worst pain score by 1-2 points for improved tolerance for bending activity. Appropriate and Ongoing  - Pt able to perform HEP correctly with minimal cueing or supervision from therapist to encourage independent management of symptoms. Appropriate and Ongoing     Long term goals: 10 weeks or 20 visits   - Pt will demonstrate increased lumbar MedX ROM by at least 6 degrees from initial ROM value, resulting in improved ability to perform functional forward bending while standing and sitting. Appropriate and Ongoing  - Pt will demonstrate increased MedX  average isometric strength value by 50% from initial test resulting in improved ability to perform bending, lifting, and carrying activities safely and confidently. Appropriate and Ongoing  - Pt to demonstrate ability to independently control and reduce their pain through posture positioning and mechanical movements throughout a typical day. Appropriate and Ongoing  - Pt will demonstrate reduced pain and improved functional outcomes as reported on the Oswestry Disability Index by reaching a score of 10% or less in order to demonstrate subjective improvement in pt's condition. . Appropriate and Ongoing  - Pt will demonstrate independence with the HEP at discharge. Appropriate and Ongoing  - Pt will be able to work out without increase in back symptoms. (patient goal) Appropriate and Ongoing     Plan   Continue with established Plan of Care towards established PT goals.     Therapist: Daniele Lin, XOCHITL  1/31/2024

## 2024-02-02 ENCOUNTER — DOCUMENTATION ONLY (OUTPATIENT)
Dept: REHABILITATION | Facility: HOSPITAL | Age: 54
End: 2024-02-02
Payer: COMMERCIAL

## 2024-02-02 NOTE — PROGRESS NOTES
"Health  Consult Note    Name: Lila Pineda  Clinic Number: [id number]  Physician: No ref. provider found  Past Medical History:   Diagnosis Date    Perimenopausal     Shingles      Time In: 9:30 am  Time Out: 10:30 am    Health  Agreement signed: 2/2/24    Coaching performed:   2/2/24: initial consult 60 mins    Subjective:   Patient reports with the following...     Vision:  taking care of self.      Values:    Strengths:  likes structure    Challenges: busy work days, 12 hour shift 3 days per week.     Support:      Hobbies:      Objective:  Lila CALLAWAY was instructed to continue exploring wellbeing.      INITIAL date: 2/2/24  One a scale of 1-10, with 10 being 100% happy, how would you rate your happiness in each of the wellness areas below?    Happiness:         1     2     3     4     5     6     7     8     9     10    Initial Date: DC Date: +/- Total Change   Exercise/Movement 4     Physical Health      Stress Level      Nutrition      Sleep      Play      Body Image      Relationships      Energy/Vitality        Assessment:   2/2/24: interest in structuring in healthier lifestyle.  Lila likes "Your evening you is doing this for your morning you".    Plan:  Patient goals for next consult include   2/2/24: prep breakfast in the evening.  Go for walks. Consider a balanced meal.    Health : Marge Lantigua  2/2/2024    "

## 2024-02-07 ENCOUNTER — CLINICAL SUPPORT (OUTPATIENT)
Dept: REHABILITATION | Facility: HOSPITAL | Age: 54
End: 2024-02-07
Payer: COMMERCIAL

## 2024-02-07 DIAGNOSIS — R29.898 DECREASED STRENGTH OF TRUNK AND BACK: Primary | ICD-10-CM

## 2024-02-07 PROCEDURE — 97750 PHYSICAL PERFORMANCE TEST: CPT | Mod: 32

## 2024-02-07 NOTE — PROGRESS NOTES
[facility]  Physical Therapy Treatment Note     Name: Lila Pineda  Clinic Number: [id number]    Therapy Diagnosis:   Encounter Diagnosis   Name Primary?    Decreased strength of trunk and back Yes     Physician: Tammie Bejarano, *    Visit Date: 2/7/2024    Physician Orders: PT Eval and Treat  Medical Diagnosis from Referral: M54.50,G89.29 (ICD-10-CM) - Chronic right-sided low back pain without sciatica   Evaluation Date: 1/4/2024  Authorization Period Expiration: 12/5/2024  Plan of Care Expiration: 4/4/2024  Reassessment Due: 3/6/2024   Visit # / Visits authorized: 6/20 (employee benefit )  MedX testing visit 2     Time In: 11:35 AM    Time Out: 12:30 PM  Total Billable Time: 55 minutes  INSURANCE and OUTCOMES: Program Benefit Group with Lumbar Outcomes (Oswestry and AQoL) 1/3     Precautions: standard     Pattern of pain determined: 1/movement responder      Subjective   Lila reports mild right sided low back pain today at start of session, rated 2/10.  Pt off this week, so pain is more manageable.       Patient reports tolerating previous visit : N0 c/o , feels better  Patient reports their pain to be 2/10 on a 0-10 scale with 0 being no pain and 10 being the worst pain imaginable.  Pain Location: low back     Occupation: Nurse at [employer]   Leisure: going to park, spending time with family, would like to get back to working out , 3 adult children    Pt goals: perform job duties without pain, household chores without pain, return to working out     Objective      Lumbar  Isometric Testing on Med X equipment: Testing administered by PT    Test Initial Baseline Midpoint Final   Date 1/9/24     ROM 6-42 deg     Max Peak Torque 97      Min Peak Torque 47      Flex/Ext Ratio 2.1/1     % below normative data -46%     % gain from initial test Not available visit 1       MOVEMENT LOSS - Lumbar    Norms ROM Loss  Range of motion 2/7/2024   Flexion Fingers  touch toes, sacral angle >/= 70 deg, uniform spinal curvature, posterior weight shift  within functional limits Within functional limits   Extension ASIS surpasses toes, spine of scapulae surpasses heels, uniform spinal curve minimal loss Within functional limits   Side glide Right   within functional limits Within functional limits   Side glide Left   within functional limits Within functional limits   Rotation Right PT observes contralateral shoulder minimal loss Within functional limits   Rotation Left PT observes contralateral shoulder minimal loss Within functional limits      OUTCOMES SELECTION:   Program Patient Outcome Measures     Oswestry Score:  11/50 = 22% disability   Visit 6: 6/50 + 12% disability     AQoL Score:  3/36 = 8% disability  Visit 6: 2/36 = 6% disability         Treatment     Lila received the treatments listed below:      Medical MedX Treatment as follows:  Lila received neuromuscular education  to isolate and engage spinal stabilization musculature correctly for motor control and coordination to aid in function and posture for 10 minutes on the Medical Medx Machine.  Patient performed MedX dynamic exercise with emphasis on spinal muscular control using pacer throughout  active range of motion. Therapist assisted patient in achieving optimal exertion for neural reeducation and endurance training by using the  Karon Exertion Rating scale, by instructing the patient to aim for mid range of exertion, performing 15-20 repetitions, slowly, correctly,and safely        2/7/2024    12:03 PM   [facility]   Visit Number 6   VAS Pain Rating 2   Time 5   Extension in Lying 10   Extension in Standing 10   Flexion in Lying 10   Lumbar Weight 49 lbs   Repetitions 15   Rating of Perceived Exertion 3   Ice - Z Lie (in min.) 5         Lila participated in therapeutic exercises to develop strength, endurance, ROM, flexibility, posture, and core stabilization for 50 minutes including:    LTR x  10  Open book x 10  DKTC x 10  TrA + SLR x 10  PPT + BKFO RTB x10   Bridges with 3 sec hold c/GTB x  20  Clamshells c/ GTB x 15   Extension in lying x 10 (cuing not to use glutes)  Extension in standing  x 10    Peripheral muscle strengthening which included one set of 15-20 repetitions at a slow and controlled 10-13 second per rep pace focused on strengthening supporting musculature in order to improve body mechanics and functional mobility. Patient and therapist focused on proper form during treatment to ensure optimal strengthening of each targeted muscle group.  Machines utilized included:Torso rotation, Leg Ext, Hip Abd, Hip Add, and Leg Press:Leg Curl, Chest Press, Rowing, Triceps, and Biceps    Lila received manual therapy techniques for 0  minutes. The following activities were included:      Pt given cold pack for 5 minutes to low back in z lie.    Patient Education and Home Exercises   Home exercises include:  LTR X 10  Open book X 10  Bridges X 10  Clamshells X 10   Extension in lying X 10   Extension in standing  X 10  Cardio program (V5): - 1/31/24   Lifting education (V11): -  Posture/Lumbar roll: not yet, reminded her of value and ice packs 1/9/24  Fridge Magnet Discharge handout (date given): -  Equipment at home/gym membership: [employer]    Education provided:   - cues w/ex's  - MedX performance  - Precor ex performance  - 1/31/24 Availability of Health coaching.    Written Home Exercises Provided: yes.  Exercises were reviewed and Lila was able to demonstrate them prior to the end of the session.  Lila demonstrated good  understanding of the education provided.     See EMR under Patient Instructions for exercises provided prior visit.    Assessment   Lila returns with mild lower back stiffness/discomfort.Treatment continued with mobility, strengthening, and neuro re-education exercises.  Lumbar MedX resistance increased to 49 ft/lbs and she completed 15 reps with a RPE = 3/10.  She was  able to complete the full circuit of peripheral strengthening ex's without c/o. Will continue to progress per HB protocol and patient tolerance.     Patient is making progress towards established goals.  Pt will continue to benefit from skilled outpatient physical therapy to address the deficits stated in the impairment chart, provide pt/family education and to maximize pt's level of independence in the home and community environment.     Anticipated Barriers for therapy: nil  Pt's spiritual, cultural and educational needs considered and pt agreeable to plan of care and goals as stated below:       Short term goals:  6 weeks or 10 visits   - Pt will demonstrate increased lumbar MedX ROM by at least 3 degrees from the initial ROM value with improvements noted in functional ROM and ability to perform ADLs. Appropriate and Ongoing  - Pt will demonstrate increased MedX average isometric strength value by 25% from initial test resulting in improved ability to perform bending, lifting, and carrying activities safely, confidently. Appropriate and Ongoing  - Pt will report a reduction in worst pain score by 1-2 points for improved tolerance for bending activity. Appropriate and Ongoing  - Pt able to perform HEP correctly with minimal cueing or supervision from therapist to encourage independent management of symptoms. Appropriate and Ongoing     Long term goals: 10 weeks or 20 visits   - Pt will demonstrate increased lumbar MedX ROM by at least 6 degrees from initial ROM value, resulting in improved ability to perform functional forward bending while standing and sitting. Appropriate and Ongoing  - Pt will demonstrate increased MedX average isometric strength value by 50% from initial test resulting in improved ability to perform bending, lifting, and carrying activities safely and confidently. Appropriate and Ongoing  - Pt to demonstrate ability to independently control and reduce their pain through posture positioning and  mechanical movements throughout a typical day. Appropriate and Ongoing  - Pt will demonstrate reduced pain and improved functional outcomes as reported on the Oswestry Disability Index by reaching a score of 10% or less in order to demonstrate subjective improvement in pt's condition. . Appropriate and Ongoing  - Pt will demonstrate independence with the HEP at discharge. Appropriate and Ongoing  - Pt will be able to work out without increase in back symptoms. (patient goal) Appropriate and Ongoing     Plan   Continue with established Plan of Care towards established PT goals.     Therapist: Yue Chavez, PT  2/7/2024

## 2024-02-23 ENCOUNTER — CLINICAL SUPPORT (OUTPATIENT)
Dept: REHABILITATION | Facility: HOSPITAL | Age: 54
End: 2024-02-23
Payer: COMMERCIAL

## 2024-02-23 DIAGNOSIS — R29.898 DECREASED STRENGTH OF TRUNK AND BACK: Primary | ICD-10-CM

## 2024-02-23 NOTE — PROGRESS NOTES
[facility]  Physical Therapy Treatment Note     Name: Lila Pineda  Clinic Number: [id number]    Therapy Diagnosis:   Encounter Diagnosis   Name Primary?    Decreased strength of trunk and back Yes       Physician: Tammie Bejarano, *    Visit Date: 2/23/2024    Physician Orders: PT Eval and Treat  Medical Diagnosis from Referral: M54.50,G89.29 (ICD-10-CM) - Chronic right-sided low back pain without sciatica   Evaluation Date: 1/4/2024  Authorization Period Expiration: 12/5/2024  Plan of Care Expiration: 4/4/2024  Reassessment Due: 3/6/2024   Visit # / Visits authorized: 6/20 (employee benefit )  MedX testing visit 2     Time In: 10:04 AM    Time Out: 11:00 AM  Total Billable Time: 50 minutes  INSURANCE and OUTCOMES: Program Benefit Group with Lumbar Outcomes (Oswestry and AQoL) 1/3     Precautions: standard     Pattern of pain determined: 1/movement responder      Subjective   Lila reports she is tired today: she worked a 12 hour shift yesterday. No real pain, mostly just fatigue.     Patient reports tolerating previous visit : N0 c/o , feels better  Patient reports their pain to be 2/10 on a 0-10 scale with 0 being no pain and 10 being the worst pain imaginable.  Pain Location: low back     Occupation: Nurse at [employer]   Leisure: going to park, spending time with family, would like to get back to working out , 3 adult children    Pt goals: perform job duties without pain, household chores without pain, return to working out     Objective      Lumbar  Isometric Testing on Med X equipment: Testing administered by PT    Test Initial Baseline Midpoint Final   Date 1/9/24     ROM 6-42 deg     Max Peak Torque 97      Min Peak Torque 47      Flex/Ext Ratio 2.1/1     % below normative data -46%     % gain from initial test Not available visit 1       MOVEMENT LOSS - Lumbar    Norms ROM Loss  Range of motion 2/7/2024   Flexion Fingers touch toes, sacral angle  >/= 70 deg, uniform spinal curvature, posterior weight shift  within functional limits Within functional limits   Extension ASIS surpasses toes, spine of scapulae surpasses heels, uniform spinal curve minimal loss Within functional limits   Side glide Right   within functional limits Within functional limits   Side glide Left   within functional limits Within functional limits   Rotation Right PT observes contralateral shoulder minimal loss Within functional limits   Rotation Left PT observes contralateral shoulder minimal loss Within functional limits      OUTCOMES SELECTION:   Program Patient Outcome Measures     Oswestry Score:  11/50 = 22% disability   Visit 6: 6/50 + 12% disability     AQoL Score:  3/36 = 8% disability  Visit 6: 2/36 = 6% disability         Treatment     Lila received the treatments listed below:      Medical MedX Treatment as follows:  Lila received neuromuscular education  to isolate and engage spinal stabilization musculature correctly for motor control and coordination to aid in function and posture for 10 minutes on the Medical Medx Machine.  Patient performed MedX dynamic exercise with emphasis on spinal muscular control using pacer throughout  active range of motion. Therapist assisted patient in achieving optimal exertion for neural reeducation and endurance training by using the  Karon Exertion Rating scale, by instructing the patient to aim for mid range of exertion, performing 15-20 repetitions, slowly, correctly,and safely        2/23/2024    10:04 AM   [facility]   Visit Number 7   VAS Pain Rating 2   Time 5   Extension in Lying 10   Extension in Standing 10   Flexion in Lying 10   Lumbar Weight 49 lbs   Repetitions 18   Rating of Perceived Exertion 4   Ice - Z Lie (in min.) 5        Lila participated in therapeutic exercises to develop strength, endurance, ROM, flexibility, posture, and core stabilization for 50 minutes including:    LTR x 10  Open book x 10  DKTC x  10  TrA + SLR x 10  PPT + BKFO RTB x10   Bridges with 3 sec hold c/GTB x  20  Clamshells c/ GTB x 15   Extension in lying x 10 (cuing not to use glutes)  Extension in standing  x 10    Peripheral muscle strengthening which included one set of 15-20 repetitions at a slow and controlled 10-13 second per rep pace focused on strengthening supporting musculature in order to improve body mechanics and functional mobility. Patient and therapist focused on proper form during treatment to ensure optimal strengthening of each targeted muscle group.  Machines utilized included:Torso rotation, Leg Ext, Hip Abd, Hip Add, and Leg Press:Leg Curl, Chest Press, Rowing, Triceps, and Biceps    Lila received manual therapy techniques for 0  minutes. The following activities were included:      Pt given cold pack for 5 minutes to low back in z lie.    Patient Education and Home Exercises   Home exercises include:  LTR X 10  Open book X 10  Bridges X 10  Clamshells X 10   Extension in lying X 10   Extension in standing  X 10  Cardio program (V5): - 1/31/24   Lifting education (V11): -  Posture/Lumbar roll: not yet, reminded her of value and ice packs 1/9/24  Fridge [employer] Discharge handout (date given): -  Equipment at home/gym membership: [employer]    Education provided:   - cues w/ex's  - MedX performance  - Precor ex performance  - 1/31/24 Availability of Health coaching.    Written Home Exercises Provided: yes.  Exercises were reviewed and Lila was able to demonstrate them prior to the end of the session.  Lila demonstrated good  understanding of the education provided.     See EMR under Patient Instructions for exercises provided prior visit.    Assessment   Lila presents to therapy today with no new complaints. She tolerated therapeutic exercises well - none increased today due to fatigue. Maintained Lumbar MedX resistance at 49 ft/lbs, and increased repetitions to 18, with exertion rating of 4/10 - of note, repetitions  were split into one set of 3 and one set of 15 due to weight chain being too loose for first set. She tolerated increases on Precor machines well, with no increase in symptoms. PT will continue to progress exercises as tolerated for increased trunk strength and ROM and decreased pain.     Patient is making progress towards established goals.  Pt will continue to benefit from skilled outpatient physical therapy to address the deficits stated in the impairment chart, provide pt/family education and to maximize pt's level of independence in the home and community environment.     Anticipated Barriers for therapy: nil  Pt's spiritual, cultural and educational needs considered and pt agreeable to plan of care and goals as stated below:       Short term goals:  6 weeks or 10 visits   - Pt will demonstrate increased lumbar MedX ROM by at least 3 degrees from the initial ROM value with improvements noted in functional ROM and ability to perform ADLs. Appropriate and Ongoing  - Pt will demonstrate increased MedX average isometric strength value by 25% from initial test resulting in improved ability to perform bending, lifting, and carrying activities safely, confidently. Appropriate and Ongoing  - Pt will report a reduction in worst pain score by 1-2 points for improved tolerance for bending activity. Appropriate and Ongoing  - Pt able to perform HEP correctly with minimal cueing or supervision from therapist to encourage independent management of symptoms. Appropriate and Ongoing     Long term goals: 10 weeks or 20 visits   - Pt will demonstrate increased lumbar MedX ROM by at least 6 degrees from initial ROM value, resulting in improved ability to perform functional forward bending while standing and sitting. Appropriate and Ongoing  - Pt will demonstrate increased MedX average isometric strength value by 50% from initial test resulting in improved ability to perform bending, lifting, and carrying activities safely and  confidently. Appropriate and Ongoing  - Pt to demonstrate ability to independently control and reduce their pain through posture positioning and mechanical movements throughout a typical day. Appropriate and Ongoing  - Pt will demonstrate reduced pain and improved functional outcomes as reported on the Oswestry Disability Index by reaching a score of 10% or less in order to demonstrate subjective improvement in pt's condition. . Appropriate and Ongoing  - Pt will demonstrate independence with the HEP at discharge. Appropriate and Ongoing  - Pt will be able to work out without increase in back symptoms. (patient goal) Appropriate and Ongoing     Plan   Continue with established Plan of Care towards established PT goals.     Therapist: Janee Castro, PT  Co-treated with Clotilde Lew PT  2/23/2024

## 2024-02-26 ENCOUNTER — CLINICAL SUPPORT (OUTPATIENT)
Dept: REHABILITATION | Facility: HOSPITAL | Age: 54
End: 2024-02-26
Payer: COMMERCIAL

## 2024-02-26 DIAGNOSIS — R29.898 DECREASED STRENGTH OF TRUNK AND BACK: Primary | ICD-10-CM

## 2024-02-26 PROCEDURE — 97750 PHYSICAL PERFORMANCE TEST: CPT | Mod: 32

## 2024-02-26 NOTE — PROGRESS NOTES
[facility]  Physical Therapy Treatment Note     Name: Lila Pineda  Clinic Number: [id number]    Therapy Diagnosis:   Encounter Diagnosis   Name Primary?    Decreased strength of trunk and back Yes       Physician: Tammie Bejarano, *    Visit Date: 2/26/2024    Physician Orders: PT Eval and Treat  Medical Diagnosis from Referral: M54.50,G89.29 (ICD-10-CM) - Chronic right-sided low back pain without sciatica   Evaluation Date: 1/4/2024  Authorization Period Expiration: 12/5/2024  Plan of Care Expiration: 4/4/2024  Reassessment Due: 3/6/2024   Visit # / Visits authorized: 8/20 (employee benefit )  MedX testing visit 2     Time In: 12:35 PM   Time Out: 1:30 PM    Total Billable Time: 50 minutes  INSURANCE and OUTCOMES: Program Benefit Group with Lumbar Outcomes (Oswestry and AQoL) 1/3     Precautions: standard     Pattern of pain determined: 1/movement responder      Subjective   Lila reports that she is doing well today and is without c/o pain currently. She reports improved overall sitting tolerance with less pain.    Patient reports tolerating previous visit : No c/o  Patient reports their pain to be 0/10 on a 0-10 scale with 0 being no pain and 10 being the worst pain imaginable.  Pain Location: low back     Occupation: Nurse at [employer]   Leisure: going to park, spending time with family, would like to get back to working out , 3 adult children    Pt goals: perform job duties without pain, household chores without pain, return to working out     Objective      Lumbar  Isometric Testing on Med X equipment: Testing administered by PT    Test Initial Baseline Midpoint Final   Date 1/9/24     ROM 6-42 deg     Max Peak Torque 97      Min Peak Torque 47      Flex/Ext Ratio 2.1/1     % below normative data -46%     % gain from initial test Not available visit 1       MOVEMENT LOSS - Lumbar    Norms ROM Loss  Range of motion 2/7/2024   Flexion Fingers touch  toes, sacral angle >/= 70 deg, uniform spinal curvature, posterior weight shift  within functional limits Within functional limits   Extension ASIS surpasses toes, spine of scapulae surpasses heels, uniform spinal curve minimal loss Within functional limits   Side glide Right   within functional limits Within functional limits   Side glide Left   within functional limits Within functional limits   Rotation Right PT observes contralateral shoulder minimal loss Within functional limits   Rotation Left PT observes contralateral shoulder minimal loss Within functional limits      OUTCOMES SELECTION:   Program Patient Outcome Measures     Oswestry Score:  11/50 = 22% disability   Visit 6: 6/50 + 12% disability     AQoL Score:  3/36 = 8% disability  Visit 6: 2/36 = 6% disability         Treatment     Lila received the treatments listed below:      Medical MedX Treatment as follows:  Lila received neuromuscular education  to isolate and engage spinal stabilization musculature correctly for motor control and coordination to aid in function and posture for 10 minutes on the Medical Medx Machine.  Patient performed MedX dynamic exercise with emphasis on spinal muscular control using pacer throughout  active range of motion. Therapist assisted patient in achieving optimal exertion for neural reeducation and endurance training by using the  Karon Exertion Rating scale, by instructing the patient to aim for mid range of exertion, performing 15-20 repetitions, slowly, correctly,and safely         2/26/2024    12:55 PM   [facility] - Short   Visit Number 8   VAS Pain Rating 0   Treadmill Time (in min.) 5 min   Lumbar Stretches - Slouch 10   Extension in Lying 10   Extension in Standing 10   Flexion in Lying 10   Lumbar Flexion 48   Lumbar Extension 3   Lumbar Weight 49 lbs   Repetitions 20   Rating of Perceived Exertion 4      Lila participated in therapeutic exercises to develop strength, endurance, ROM, flexibility,  posture, and core stabilization for 40 minutes including:    LTR x 10  Open book x 5 and x 5 with RTB resistance  DKTC x 10  TrA + SLR + 1# x 10  PPT + BKFO GTB x10--NP  Bridges with 3 sec hold c/GTB x  20  +Sidelying plank clamshells c/ GTB x 10 (challenging)  Extension in lying x 10 (cuing not to use glutes)  +SOC x 10   Extension in standing  x 10    Peripheral muscle strengthening which included one set of 15-20 repetitions at a slow and controlled 10-13 second per rep pace focused on strengthening supporting musculature in order to improve body mechanics and functional mobility. Patient and therapist focused on proper form during treatment to ensure optimal strengthening of each targeted muscle group.  Machines utilized included:Torso rotation, Leg Ext, Hip Abd, Hip Add, and Leg Press:Leg Curl, Chest Press, Rowing, Triceps, and Biceps    Lila received manual therapy techniques for 0  minutes. The following activities were included:      Pt given cold pack for 5 minutes to low back in z lie.    Patient Education and Home Exercises   Home exercises include:  LTR X 10  Open book X 10  Bridges X 10  Clamshells X 10   Extension in lying X 10   Extension in standing  X 10  Cardio program (V5): - 1/31/24   Lifting education (V11): -  Posture/Lumbar roll: not yet, reminded her of value and ice packs 1/9/24  Fridge Magnet Discharge handout (date given): -  Equipment at home/gym membership: [employer]    Education provided:   - cues w/ex's  - MedX performance  - Precor ex performance  - 1/31/24 Availability of Health coaching.    Written Home Exercises Provided: yes.  Exercises were reviewed and Lila was able to demonstrate them prior to the end of the session.  Lila demonstrated good  understanding of the education provided.     See EMR under Patient Instructions for exercises provided prior visit.    Assessment   Lila returns without c/o pain. Treatment continued with mobility, strengthening, and neuro  re-education exercises. She was progressed to perform SOC for mobility and RTB resistance with open book, 1# resistance for TrA + SLR and also added sidelying plank hold with clamshells for progressive strengthening which was challenging but able to perform without increased symptoms. She was able to perform ex's including progressions without c/o. Lumbar MedX Flexion ROM was increased to 48 degrees and resistance maintained at 49 ft/lbs completing 20 reps with a RPE = 4/10. She was able to complete the full circuit of peripheral strengthening ex's without c/o. Will continue to progress per HB protocol and patient tolerance.     Patient is making progress towards established goals.  Pt will continue to benefit from skilled outpatient physical therapy to address the deficits stated in the impairment chart, provide pt/family education and to maximize pt's level of independence in the home and community environment.     Anticipated Barriers for therapy: nil  Pt's spiritual, cultural and educational needs considered and pt agreeable to plan of care and goals as stated below:       Short term goals:  6 weeks or 10 visits   - Pt will demonstrate increased lumbar MedX ROM by at least 3 degrees from the initial ROM value with improvements noted in functional ROM and ability to perform ADLs. Appropriate and Ongoing  - Pt will demonstrate increased MedX average isometric strength value by 25% from initial test resulting in improved ability to perform bending, lifting, and carrying activities safely, confidently. Appropriate and Ongoing  - Pt will report a reduction in worst pain score by 1-2 points for improved tolerance for bending activity. Appropriate and Ongoing  - Pt able to perform HEP correctly with minimal cueing or supervision from therapist to encourage independent management of symptoms. Appropriate and Ongoing     Long term goals: 10 weeks or 20 visits   - Pt will demonstrate increased lumbar MedX ROM by at least  6 degrees from initial ROM value, resulting in improved ability to perform functional forward bending while standing and sitting. Appropriate and Ongoing  - Pt will demonstrate increased MedX average isometric strength value by 50% from initial test resulting in improved ability to perform bending, lifting, and carrying activities safely and confidently. Appropriate and Ongoing  - Pt to demonstrate ability to independently control and reduce their pain through posture positioning and mechanical movements throughout a typical day. Appropriate and Ongoing  - Pt will demonstrate reduced pain and improved functional outcomes as reported on the Oswestry Disability Index by reaching a score of 10% or less in order to demonstrate subjective improvement in pt's condition. . Appropriate and Ongoing  - Pt will demonstrate independence with the HEP at discharge. Appropriate and Ongoing  - Pt will be able to work out without increase in back symptoms. (patient goal) Appropriate and Ongoing     Plan   Continue with established Plan of Care towards established PT goals.     Therapist: Daniele Lin, PTA    2/26/2024

## 2024-03-01 ENCOUNTER — CLINICAL SUPPORT (OUTPATIENT)
Dept: REHABILITATION | Facility: HOSPITAL | Age: 54
End: 2024-03-01
Payer: COMMERCIAL

## 2024-03-01 DIAGNOSIS — R29.898 DECREASED STRENGTH OF TRUNK AND BACK: Primary | ICD-10-CM

## 2024-03-01 PROCEDURE — 97750 PHYSICAL PERFORMANCE TEST: CPT | Mod: 32

## 2024-03-01 NOTE — PROGRESS NOTES
[facility]  Physical Therapy Treatment Note     Name: Lila Pineda  Clinic Number: [id number]    Therapy Diagnosis:   Encounter Diagnosis   Name Primary?    Decreased strength of trunk and back Yes       Physician: Tammie Bejarano, *    Visit Date: 3/1/2024    Physician Orders: PT Eval and Treat  Medical Diagnosis from Referral: M54.50,G89.29 (ICD-10-CM) - Chronic right-sided low back pain without sciatica   Evaluation Date: 1/4/2024  Authorization Period Expiration: 12/5/2024  Plan of Care Expiration: 4/4/2024  Reassessment Due: 3/6/2024   Visit # / Visits authorized: 9/20 (employee benefit )  MedX testing visit 2     Time In: 9:00 AM   Time Out: 9:55 AM  Total Billable Time: 55 minutes  INSURANCE and OUTCOMES: Program Benefit Group with Lumbar Outcomes (Oswestry and AQoL) 1/3     Precautions: standard     Pattern of pain determined: 1/movement responder      Subjective   Lila reports mild (R) lower back/glute discomfort currently. States that is may be related to wearing her [employer] shoes.     Patient reports tolerating previous visit : No c/o  Patient reports their pain to be 0/10 on a 0-10 scale with 0 being no pain and 10 being the worst pain imaginable.  Pain Location: low back     Occupation: Nurse at [employer]   Leisure: going to park, spending time with family, would like to get back to working out , 3 adult children    Pt goals: perform job duties without pain, household chores without pain, return to working out     Objective      Lumbar  Isometric Testing on Med X equipment: Testing administered by PT    Test Initial Baseline Midpoint Final   Date 1/9/24     ROM 6-42 deg     Max Peak Torque 97      Min Peak Torque 47      Flex/Ext Ratio 2.1/1     % below normative data -46%     % gain from initial test Not available visit 1       MOVEMENT LOSS - Lumbar    Norms ROM Loss  Range of motion 2/7/2024   Flexion Fingers touch toes, sacral angle >/= 70  deg, uniform spinal curvature, posterior weight shift  within functional limits Within functional limits   Extension ASIS surpasses toes, spine of scapulae surpasses heels, uniform spinal curve minimal loss Within functional limits   Side glide Right   within functional limits Within functional limits   Side glide Left   within functional limits Within functional limits   Rotation Right PT observes contralateral shoulder minimal loss Within functional limits   Rotation Left PT observes contralateral shoulder minimal loss Within functional limits      OUTCOMES SELECTION:   Program Patient Outcome Measures     Oswestry Score:  11/50 = 22% disability   Visit 6: 6/50 + 12% disability     AQoL Score:  3/36 = 8% disability  Visit 6: 2/36 = 6% disability         Treatment     Lila received the treatments listed below:      Medical MedX Treatment as follows:  Lila received neuromuscular education  to isolate and engage spinal stabilization musculature correctly for motor control and coordination to aid in function and posture for 10 minutes on the Medical Medx Machine.  Patient performed MedX dynamic exercise with emphasis on spinal muscular control using pacer throughout  active range of motion. Therapist assisted patient in achieving optimal exertion for neural reeducation and endurance training by using the  Karon Exertion Rating scale, by instructing the patient to aim for mid range of exertion, performing 15-20 repetitions, slowly, correctly,and safely        3/1/2024     9:04 AM   [facility] - Short   Visit Number 9   VAS Pain Rating 2   Treadmill Time (in min.) 5 min   Extension in Lying 10   Extension in Standing 10   Flexion in Lying 10   Lumbar Weight 52 lbs   Repetitions 15   Rating of Perceived Exertion 3       Lila participated in therapeutic exercises to develop strength, endurance, ROM, flexibility, posture, and core stabilization for 40 minutes including:    LTR x 10  Open book x 5 and x 5 with  RTB resistance  DKTC x 10  +Piriformis stretch 2 x 20 sec  TrA + SLR + 1# x 10  PPT + BKFO GTB x10--NP  Bridges with 3 sec hold c/GTB x  20  Sidelying plank clamshells c/ GTB x 10 (challenging)  Extension in lying x 10 (cuing not to use glutes)  +Cat/cow x 10  +Bird dog (LE only) x 10 (cue for level pelvis)  SOC x 10- NP   Extension in standing  x 10      Peripheral muscle strengthening which included one set of 15-20 repetitions at a slow and controlled 10-13 second per rep pace focused on strengthening supporting musculature in order to improve body mechanics and functional mobility. Patient and therapist focused on proper form during treatment to ensure optimal strengthening of each targeted muscle group.  Machines utilized included:Torso rotation, Leg Ext, Hip Abd, Hip Add, and Leg Press:Leg Curl, Chest Press, Rowing, Triceps, and Biceps    Lila received manual therapy techniques for 0  minutes. The following activities were included:    Pt given cold pack for 5 minutes to low back in z lie.    Patient Education and Home Exercises   Home exercises include:  LTR X 10  Open book X 10  Bridges X 10  Clamshells X 10   Extension in lying X 10   Extension in standing  X 10  Piriformis stretch  Cardio program (V5): - 1/31/24   Lifting education (V11): -  Posture/Lumbar roll: not yet, reminded her of value and ice packs 1/9/24  Fridge Magnet Discharge handout (date given): -  Equipment at home/gym membership: [employer]    Education provided:   - cues w/ex's  - MedX performance  - Precor ex performance  - 1/31/24 Availability of Health coaching.    Written Home Exercises Provided: yes. Added piriformis stretch 3/1/24  Exercises were reviewed and Lila was able to demonstrate them prior to the end of the session.  Lila demonstrated good  understanding of the education provided.     See EMR under Patient Instructions for exercises provided prior visit     Assessment   Lila returns with mild report of R lower back  and R glute pain currently. Treatment continued with mobility, strengthening, and neuro re-education exercises. Added piriformis stretch to address glute discomfort which felt good per subjective report (added to HEP). She was also progressed to perform cat/cow stretch for mobility and  LE bird dog (cues for level pelvis) for progressive strengthening. She was able to perform ex's including progressions without c/o. Lumbar MedX resistance was increased to 52 ft/lbs completing 15 reps with a RPE = 3/10. She was able to complete the full circuit of peripheral strengthening ex's without c/o. Will continue to progress per HB protocol and patient tolerance.     Patient is making progress towards established goals.  Pt will continue to benefit from skilled outpatient physical therapy to address the deficits stated in the impairment chart, provide pt/family education and to maximize pt's level of independence in the home and community environment.     Anticipated Barriers for therapy: nil  Pt's spiritual, cultural and educational needs considered and pt agreeable to plan of care and goals as stated below:       Short term goals:  6 weeks or 10 visits   - Pt will demonstrate increased lumbar MedX ROM by at least 3 degrees from the initial ROM value with improvements noted in functional ROM and ability to perform ADLs. Appropriate and Ongoing  - Pt will demonstrate increased MedX average isometric strength value by 25% from initial test resulting in improved ability to perform bending, lifting, and carrying activities safely, confidently. Appropriate and Ongoing  - Pt will report a reduction in worst pain score by 1-2 points for improved tolerance for bending activity. Appropriate and Ongoing  - Pt able to perform HEP correctly with minimal cueing or supervision from therapist to encourage independent management of symptoms. Appropriate and Ongoing     Long term goals: 10 weeks or 20 visits   - Pt will demonstrate increased  lumbar MedX ROM by at least 6 degrees from initial ROM value, resulting in improved ability to perform functional forward bending while standing and sitting. Appropriate and Ongoing  - Pt will demonstrate increased MedX average isometric strength value by 50% from initial test resulting in improved ability to perform bending, lifting, and carrying activities safely and confidently. Appropriate and Ongoing  - Pt to demonstrate ability to independently control and reduce their pain through posture positioning and mechanical movements throughout a typical day. Appropriate and Ongoing  - Pt will demonstrate reduced pain and improved functional outcomes as reported on the Oswestry Disability Index by reaching a score of 10% or less in order to demonstrate subjective improvement in pt's condition. . Appropriate and Ongoing  - Pt will demonstrate independence with the HEP at discharge. Appropriate and Ongoing  - Pt will be able to work out without increase in back symptoms. (patient goal) Appropriate and Ongoing     Plan   Continue with established Plan of Care towards established PT goals.     Therapist: Daniele Lin, XOCHITL    3/1/2024

## 2024-03-05 NOTE — PROGRESS NOTES
[facility]  Physical Therapy Treatment Note     Name: Lila Pineda  Clinic Number: [id number]    Therapy Diagnosis:   No diagnosis found.    Physician: Tammie Bejarano, *    Visit Date: 3/6/2024    Physician Orders: PT Eval and Treat  Medical Diagnosis from Referral: M54.50,G89.29 (ICD-10-CM) - Chronic right-sided low back pain without sciatica   Evaluation Date: 1/4/2024  Authorization Period Expiration: 12/5/2024  Plan of Care Expiration: 4/4/2024  Reassessment Due: 3/6/2024   Visit # / Visits authorized: 9/20 (employee benefit )  MedX testing visit 2     Time In: 9:00 AM   Time Out: 9:55 AM  Total Billable Time: 55 minutes  INSURANCE and OUTCOMES: Program Benefit Group with Lumbar Outcomes (Oswestry and AQoL) 1/3     Precautions: standard     Pattern of pain determined: 1/movement responder      Subjective   Lila reports mild (R) lower back/glute discomfort currently. States that is may be related to wearing her [employer] shoes.     Patient reports tolerating previous visit : No c/o  Patient reports their pain to be 0/10 on a 0-10 scale with 0 being no pain and 10 being the worst pain imaginable.  Pain Location: low back     Occupation: Nurse at [employer]   Leisure: going to park, spending time with family, would like to get back to working out , 3 adult children    Pt goals: perform job duties without pain, household chores without pain, return to working out     Objective      Lumbar  Isometric Testing on Med X equipment: Testing administered by PT    Test Initial Baseline Midpoint Final   Date 1/9/24     ROM 6-42 deg     Max Peak Torque 97      Min Peak Torque 47      Flex/Ext Ratio 2.1/1     % below normative data -46%     % gain from initial test Not available visit 1       MOVEMENT LOSS - Lumbar    Norms ROM Loss Initial ROM 2/7/24   Flexion Fingers touch toes, sacral angle >/= 70 deg, uniform spinal curvature, posterior weight shift  within  functional limits Within functional limits   Extension ASIS surpasses toes, spine of scapulae surpasses heels, uniform spinal curve minimal loss Within functional limits   Side glide Right   within functional limits Within functional limits   Side glide Left   within functional limits Within functional limits   Rotation Right PT observes contralateral shoulder minimal loss Within functional limits   Rotation Left PT observes contralateral shoulder minimal loss Within functional limits      OUTCOMES SELECTION:   Program Patient Outcome Measures     Oswestry Score:  11/50 = 22% disability   Visit 6: 6/50 + 12% disability     AQoL Score:  3/36 = 8% disability  Visit 6: 2/36 = 6% disability         Treatment     Lila received the treatments listed below:      Medical MedX Treatment as follows:  Lila received neuromuscular education  to isolate and engage spinal stabilization musculature correctly for motor control and coordination to aid in function and posture for 10 minutes on the Medical Medx Machine.  Patient performed MedX dynamic exercise with emphasis on spinal muscular control using pacer throughout  active range of motion. Therapist assisted patient in achieving optimal exertion for neural reeducation and endurance training by using the  Karon Exertion Rating scale, by instructing the patient to aim for mid range of exertion, performing 15-20 repetitions, slowly, correctly,and safely        3/1/2024     9:04 AM   [facility] - [facility]   Visit Number 9   VAS Pain Rating 2   Treadmill Time (in min.) 5 min   Extension in Lying 10   Extension in Standing 10   Flexion in Lying 10   Lumbar Weight 52 lbs   Repetitions 15   Rating of Perceived Exertion 3       Lila participated in therapeutic exercises to develop strength, endurance, ROM, flexibility, posture, and core stabilization for 40 minutes including:    LTR x 10  Open book x 5 and x 5 with RTB resistance  DKTC x 10  +Piriformis stretch 2 x 20 sec  TrA +  SLR + 1# x 10  PPT + BKFO GTB x10--NP  Bridges with 3 sec hold c/GTB x  20  Sidelying plank clamshells c/ GTB x 10 (challenging)  Extension in lying x 10 (cuing not to use glutes)  +Cat/cow x 10  +Bird dog (LE only) x 10 (cue for level pelvis)  SOC x 10- NP   Extension in standing  x 10      Peripheral muscle strengthening which included one set of 15-20 repetitions at a slow and controlled 10-13 second per rep pace focused on strengthening supporting musculature in order to improve body mechanics and functional mobility. Patient and therapist focused on proper form during treatment to ensure optimal strengthening of each targeted muscle group.  Machines utilized included:Torso rotation, Leg Ext, Hip Abd, Hip Add, and Leg Press:Leg Curl, Chest Press, Rowing, Triceps, and Biceps    Lila received manual therapy techniques for 0  minutes. The following activities were included:    Pt given cold pack for 5 minutes to low back in z lie.    Patient Education and Home Exercises   Home exercises include:  LTR X 10  Open book X 10  Bridges X 10  Clamshells X 10   Extension in lying X 10   Extension in standing  X 10  Piriformis stretch  Cardio program (V5): - 1/31/24   Lifting education (V11): -  Posture/Lumbar roll: not yet, reminded her of value and ice packs 1/9/24  Fridge Magnet Discharge handout (date given): -  Equipment at home/gym membership: [employer]    Education provided:   - cues w/ex's  - MedX performance  - Precor ex performance  - 1/31/24 Availability of Health coaching.    Written Home Exercises Provided: yes. Added piriformis stretch 3/1/24  Exercises were reviewed and Lila was able to demonstrate them prior to the end of the session.  Lila demonstrated good  understanding of the education provided.     See EMR under Patient Instructions for exercises provided prior visit     Assessment   Patient has attended 10 visits at [facility] which included MD evaluation, PT evaluation with isometric  testing, and physical therapy treatment including HEP instruction, education, aerobic activity, dynamic strengthening on MedX equipment for the spine, and whole body strengthening on MedX equipment with increasing resistance. Patient demonstrates increased ability to reduce symptoms, improve posture, improve ROM, and improve strength, as stated below:    -Improved posture, *** using lumbar roll  -Improved *** ROM, initially on MedX test *** and currently ***.  -Improved strength at each test point on lumbar MedX isometric test with *** average improvement noted with reduced pain noted by patient.  -Initial outcome tool score *** and current outcome tool score *** indicating reduced pain and improved function.      Lila returns with mild report of R lower back and R glute pain currently. Treatment continued with mobility, strengthening, and neuro re-education exercises. Added piriformis stretch to address glute discomfort which felt good per subjective report (added to HEP). She was also progressed to perform cat/cow stretch for mobility and  LE bird dog (cues for level pelvis) for progressive strengthening. She was able to perform ex's including progressions without c/o. Lumbar MedX resistance was increased to 52 ft/lbs completing 15 reps with a RPE = 3/10. She was able to complete the full circuit of peripheral strengthening ex's without c/o. Will continue to progress per HB protocol and patient tolerance.     Patient is making progress towards established goals.  Pt will continue to benefit from skilled outpatient physical therapy to address the deficits stated in the impairment chart, provide pt/family education and to maximize pt's level of independence in the home and community environment.     Anticipated Barriers for therapy: nil  Pt's spiritual, cultural and educational needs considered and pt agreeable to plan of care and goals as stated below:       Short term goals:  6 weeks or 10 visits   - Pt will  demonstrate increased lumbar MedX ROM by at least 3 degrees from the initial ROM value with improvements noted in functional ROM and ability to perform ADLs. Appropriate and Ongoing  - Pt will demonstrate increased MedX average isometric strength value by 25% from initial test resulting in improved ability to perform bending, lifting, and carrying activities safely, confidently. Appropriate and Ongoing  - Pt will report a reduction in worst pain score by 1-2 points for improved tolerance for bending activity. Appropriate and Ongoing  - Pt able to perform HEP correctly with minimal cueing or supervision from therapist to encourage independent management of symptoms. Appropriate and Ongoing     Long term goals: 10 weeks or 20 visits   - Pt will demonstrate increased lumbar MedX ROM by at least 6 degrees from initial ROM value, resulting in improved ability to perform functional forward bending while standing and sitting. Appropriate and Ongoing  - Pt will demonstrate increased MedX average isometric strength value by 50% from initial test resulting in improved ability to perform bending, lifting, and carrying activities safely and confidently. Appropriate and Ongoing  - Pt to demonstrate ability to independently control and reduce their pain through posture positioning and mechanical movements throughout a typical day. Appropriate and Ongoing  - Pt will demonstrate reduced pain and improved functional outcomes as reported on the Oswestry Disability Index by reaching a score of 10% or less in order to demonstrate subjective improvement in pt's condition. . Appropriate and Ongoing  - Pt will demonstrate independence with the HEP at discharge. Appropriate and Ongoing  - Pt will be able to work out without increase in back symptoms. (patient goal) Appropriate and Ongoing     Plan   Continue with established Plan of Care towards established PT goals.     Therapist: Kelly Phoenix, PT    3/5/2024

## 2024-03-06 ENCOUNTER — CLINICAL SUPPORT (OUTPATIENT)
Dept: REHABILITATION | Facility: HOSPITAL | Age: 54
End: 2024-03-06
Payer: COMMERCIAL

## 2024-03-06 DIAGNOSIS — R29.898 DECREASED STRENGTH OF TRUNK AND BACK: Primary | ICD-10-CM

## 2024-03-06 PROCEDURE — 97750 PHYSICAL PERFORMANCE TEST: CPT | Mod: 32

## 2024-03-06 NOTE — PROGRESS NOTES
[facility]  Physical Therapy Treatment Note     Name: Lila Pineda  Clinic Number: [id number]    Therapy Diagnosis:   Encounter Diagnosis   Name Primary?    Decreased strength of trunk and back Yes       Physician: Tammie Bejarano, *    Visit Date: 3/6/2024    Physician Orders: PT Eval and Treat  Medical Diagnosis from Referral: M54.50,G89.29 (ICD-10-CM) - Chronic right-sided low back pain without sciatica   Evaluation Date: 1/4/2024  Authorization Period Expiration: 12/5/2024  Plan of Care Expiration: 4/4/2024  Reassessment Due: 3/6/2024   Visit # / Visits authorized: 10/20 (employee benefit )  MedX testing visit 2     Time In: 9:05 AM   Time Out: 10:05 AM  Total Billable Time: 60 minutes  INSURANCE and OUTCOMES: Program Benefit Group with Lumbar Outcomes (Oswestry and AQoL) 3/3     Precautions: standard     Pattern of pain determined: 1/movement responder      Subjective   Lila denies back pain at start of session, pt with new complaints of muscle spasm at left scapula, feels it may be do to an exercise she is doing here.    Patient reports tolerating previous visit : No c/o  Patient reports their pain to be 0/10 on a 0-10 scale with 0 being no pain and 10 being the worst pain imaginable.  Pain Location: low back     Occupation: Nurse at [employer]   Leisure: going to park, spending time with family, would like to get back to working out , 3 adult children    Pt goals: perform job duties without pain, household chores without pain, return to working out     Objective      Lumbar  Isometric Testing on Med X equipment: Testing administered by PT    Test Initial Baseline Midpoint Final   Date 1/9/24 3/6/2024    ROM 6-42 deg 3-48 deg    Max Peak Torque 97  96    Min Peak Torque 47  62    Flex/Ext Ratio 2.1/1 1.5:1    % below normative data -46% 38%    % gain from initial test Not available visit 1 13%      MOVEMENT LOSS - Lumbar    Norms ROM Loss  Range of  motion 2/7/2024 Range of motion 3/6/2024   Flexion Fingers touch toes, sacral angle >/= 70 deg, uniform spinal curvature, posterior weight shift  within functional limits Within functional limits Within functional limits   Extension ASIS surpasses toes, spine of scapulae surpasses heels, uniform spinal curve minimal loss Within functional limits Within functional limits   Side glide Right   within functional limits Within functional limits Within functional limits   Side glide Left   within functional limits Within functional limits Within functional limits   Rotation Right PT observes contralateral shoulder minimal loss Within functional limits Within functional limits   Rotation Left PT observes contralateral shoulder minimal loss Within functional limits Within functional limits      OUTCOMES SELECTION:   Program Patient Outcome Measures     Oswestry Score:  11/50 = 22% disability   Visit 6: 6/50 + 12% disability  Visit 10: 6/50 12% disability     AQoL Score:  3/36 = 8% disability  Visit 6: 2/36 = 6% disability         Treatment     Lila received the treatments listed below:      Medical MedX Treatment as follows:  Lila received neuromuscular education  to isolate and engage spinal stabilization musculature correctly for motor control and coordination to aid in function and posture for 10 minutes on the Medical Medx Machine.  Patient performed MedX dynamic exercise with emphasis on spinal muscular control using pacer throughout  active range of motion. Therapist assisted patient in achieving optimal exertion for neural reeducation and endurance training by using the  Karon Exertion Rating scale, by instructing the patient to aim for mid range of exertion, performing 15-20 repetitions, slowly, correctly,and safely        3/6/2024    10:09 AM   [facility]   Visit Number 10   VAS Pain Rating 0   Treadmill Time (in min.) 5 min   Extension in Standing 10   Flexion in Lying 10   Lumbar Flexion 48   Lumbar  Extension 3   Lumbar Peak Torque 96 ft. lbs.   Min Torque 62   Test Percent Below Normative Data 38 %   Test Percent Gain in Strength from Initial  13 %   Ice - Z Lie (in min.) 5           Lila participated in therapeutic exercises to develop strength, endurance, ROM, flexibility, posture, and core stabilization for 40 minutes including:    LTR x 10  Open book x 5 and x 5 with RTB resistance  DKTC x 10  Piriformis stretch 2 x 20 sec  TrA + SLR + 1# x 10  PPT + BKFO GTB x10--NP  Bridges with 3 sec hold c/GTB x  20  (Sidelying plank) clamshells c/ GTB x 10 (held side plank due to muscle spasm at left scapula)  Extension in lying x 10 (cuing not to use glutes)  Cat/cow x 10  Bird dog (LE only) x 10 (cue for level pelvis)  SOC x 10- NP   Extension in standing  x 10      Peripheral muscle strengthening which included one set of 15-20 repetitions at a slow and controlled 10-13 second per rep pace focused on strengthening supporting musculature in order to improve body mechanics and functional mobility. Patient and therapist focused on proper form during treatment to ensure optimal strengthening of each targeted muscle group.  Machines utilized included:Torso rotation, Leg Ext, Hip Abd, Hip Add, and Leg Press:Leg Curl, Chest Press, Rowing, Triceps, and Biceps    Lila received manual therapy techniques for 0  minutes. The following activities were included:    Pt given cold pack for 5 minutes to low back in z lie.    Patient Education and Home Exercises   Home exercises include:  LTR X 10  Open book X 10  Bridges X 10  Clamshells X 10   Extension in lying X 10   Extension in standing  X 10  Piriformis stretch  Cardio program (V5): - 1/31/24   Lifting education (V11): -  Posture/Lumbar roll: not yet, reminded her of value and ice packs 1/9/24  Fridge Magnet Discharge handout (date given): -  Equipment at home/gym membership: [employer]    Education provided:   - cues w/ex's  - MedX performance  - Precor ex  performance  - 1/31/24 Availability of Health coaching.    Written Home Exercises Provided: yes. Added piriformis stretch 3/1/24  Exercises were reviewed and Lila was able to demonstrate them prior to the end of the session.  Lila demonstrated good  understanding of the education provided.     See EMR under Patient Instructions for exercises provided prior visit     Assessment   Lila has attended 10 visits at [facility] which included MD evaluation, PT evaluation with isometric testing, and physical therapy treatment including HEP instruction, education, aerobic activity, dynamic strengthening on MedX equipment for the spine, and whole body strengthening on MedX equipment with increasing resistance. Patient demonstrates increased ability to reduce symptoms, improve posture, improve ROM, and improve strength, as stated below:    -Improved posture, lumbar roll recommended  -Improved  ROM, initially on MedX test 2-42 deg and currently 3-48 deg.  -Improved strength at each test point on lumbar MedX isometric test with 13% average improvement noted with reduced pain noted by patient.  -Initial outcome tool score 22% disability and current outcome tool score 12% disability indicating reduced pain and improved function.         Patient is making progress towards established goals.  Pt will continue to benefit from skilled outpatient physical therapy to address the deficits stated in the impairment chart, provide pt/family education and to maximize pt's level of independence in the home and community environment.     Anticipated Barriers for therapy: nil  Pt's spiritual, cultural and educational needs considered and pt agreeable to plan of care and goals as stated below:       Short term goals:  6 weeks or 10 visits   - Pt will demonstrate increased lumbar MedX ROM by at least 3 degrees from the initial ROM value with improvements noted in functional ROM and ability to perform ADLs. MET  - Pt will demonstrate  increased MedX average isometric strength value by 25% from initial test resulting in improved ability to perform bending, lifting, and carrying activities safely, confidently. Appropriate and Ongoing  - Pt will report a reduction in worst pain score by 1-2 points for improved tolerance for bending activity. MET  - Pt able to perform HEP correctly with minimal cueing or supervision from therapist to encourage independent management of symptoms. MET     Long term goals: 10 weeks or 20 visits   - Pt will demonstrate increased lumbar MedX ROM by at least 6 degrees from initial ROM value, resulting in improved ability to perform functional forward bending while standing and sitting. Appropriate and Ongoing  - Pt will demonstrate increased MedX average isometric strength value by 50% from initial test resulting in improved ability to perform bending, lifting, and carrying activities safely and confidently. Appropriate and Ongoing  - Pt to demonstrate ability to independently control and reduce their pain through posture positioning and mechanical movements throughout a typical day. Appropriate and Ongoing  - Pt will demonstrate reduced pain and improved functional outcomes as reported on the Oswestry Disability Index by reaching a score of 10% or less in order to demonstrate subjective improvement in pt's condition. . Appropriate and Ongoing  - Pt will demonstrate independence with the HEP at discharge. Appropriate and Ongoing  - Pt will be able to work out without increase in back symptoms. (patient goal) Appropriate and Ongoing     Plan   Continue with established Plan of Care towards established PT goals.     Therapist: Yue Chavez, PT    3/6/2024

## 2024-03-20 ENCOUNTER — CLINICAL SUPPORT (OUTPATIENT)
Dept: REHABILITATION | Facility: HOSPITAL | Age: 54
End: 2024-03-20
Payer: COMMERCIAL

## 2024-03-20 DIAGNOSIS — R29.898 DECREASED STRENGTH OF TRUNK AND BACK: Primary | ICD-10-CM

## 2024-03-20 PROCEDURE — 97750 PHYSICAL PERFORMANCE TEST: CPT | Mod: 32

## 2024-03-20 NOTE — PROGRESS NOTES
[facility]  Physical Therapy Treatment Note     Name: Lila Pineda  Clinic Number: [id number]    Therapy Diagnosis:   Encounter Diagnosis   Name Primary?    Decreased strength of trunk and back Yes         Physician: Tammie Bejarano, *    Visit Date: 3/20/2024    Physician Orders: PT Eval and Treat  Medical Diagnosis from Referral: M54.50,G89.29 (ICD-10-CM) - Chronic right-sided low back pain without sciatica   Evaluation Date: 1/4/2024  Authorization Period Expiration: 12/5/2024  Plan of Care Expiration: 4/4/2024  Reassessment Due: 4/6/2024   Visit # / Visits authorized: 11/20 (employee benefit )  MedX testing visit 2     Time In: 12:30 PM   Time Out: 1:30PM  Total Billable Time: 55 minutes  INSURANCE and OUTCOMES: Program Benefit Group with Lumbar Outcomes (Oswestry and AQoL) 3/3     Precautions: standard     Pattern of pain determined: 1/movement responder      Subjective   Lila reports that normally the pain is on her right side, but today she has some pain on the left side of her back into her buttock. The pain in her scapula is gone.    Patient reports tolerating previous visit : No c/o  Patient reports their pain to be 3/10 on a 0-10 scale with 0 being no pain and 10 being the worst pain imaginable.  Pain Location: low back     Occupation: Nurse at [employer]   Leisure: going to park, spending time with family, would like to get back to working out , 3 adult children    Pt goals: perform job duties without pain, household chores without pain, return to working out     Objective      Lumbar  Isometric Testing on Med X equipment: Testing administered by PT    Test Initial Baseline Midpoint Final   Date 1/9/24 3/6/2024    ROM 6-42 deg 3-48 deg    Max Peak Torque 97  96    Min Peak Torque 47  62    Flex/Ext Ratio 2.1/1 1.5:1    % below normative data -46% 38%    % gain from initial test Not available visit 1 13%      MOVEMENT LOSS - Lumbar    Norms ROM  Loss  Range of motion 2/7/2024 Range of motion 3/6/2024   Flexion Fingers touch toes, sacral angle >/= 70 deg, uniform spinal curvature, posterior weight shift  within functional limits Within functional limits Within functional limits   Extension ASIS surpasses toes, spine of scapulae surpasses heels, uniform spinal curve minimal loss Within functional limits Within functional limits   Side glide Right   within functional limits Within functional limits Within functional limits   Side glide Left   within functional limits Within functional limits Within functional limits   Rotation Right PT observes contralateral shoulder minimal loss Within functional limits Within functional limits   Rotation Left PT observes contralateral shoulder minimal loss Within functional limits Within functional limits      OUTCOMES SELECTION:   Program Patient Outcome Measures     Oswestry Score:  11/50 = 22% disability   Visit 6: 6/50 + 12% disability  Visit 10: 6/50 12% disability     AQoL Score:  3/36 = 8% disability  Visit 6: 2/36 = 6% disability         Treatment     Lila received the treatments listed below:      Medical MedX Treatment as follows:  Lila received neuromuscular education  to isolate and engage spinal stabilization musculature correctly for motor control and coordination to aid in function and posture for 10 minutes on the Medical Medx Machine.  Patient performed MedX dynamic exercise with emphasis on spinal muscular control using pacer throughout  active range of motion. Therapist assisted patient in achieving optimal exertion for neural reeducation and endurance training by using the  Karon Exertion Rating scale, by instructing the patient to aim for mid range of exertion, performing 15-20 repetitions, slowly, correctly,and safely            3/20/2024    12:39 PM   [facility]   Visit Number 11   VAS Pain Rating 3   Treadmill Time (in min.) 5 min   Extension in Standing 10   Flexion in Lying 10   Lumbar  Weight 52 lbs   Repetitions 15   Rating of Perceived Exertion 4   Ice - Z Lie (in min.) 5      Lila participated in therapeutic exercises to develop strength, endurance, ROM, flexibility, posture, and core stabilization for 50 minutes including:    LTR x 10  DKTC x 10  TrA + SLR + 1# x 10  PPT + BKFO x10 cueing to maintain TrA activation    Bird dog (LE only) x 10 (cue for level pelvis)  SOC x 10  Extension in standing  x 10    +Lifting education   - floor to waist   - waist to shoulder   - sled push to mimic wheelchair    Not today 2/2 time:  Piriformis stretch 2 x 20 sec  Open book x 5 and x 5 with RTB resistance  Extension in lying x 10 (cuing not to use glutes)  Bridges with 3 sec hold c/GTB x  20  Cat/cow x 10  (Sidelying plank) clamshells c/ GTB x 10 (held side plank due to muscle spasm at left scapula)    Peripheral muscle strengthening which included one set of 15-20 repetitions at a slow and controlled 10-13 second per rep pace focused on strengthening supporting musculature in order to improve body mechanics and functional mobility. Patient and therapist focused on proper form during treatment to ensure optimal strengthening of each targeted muscle group.  Machines utilized included:Torso rotation, Leg Ext, Hip Abd, Hip Add, and Leg Press:Leg Curl, Chest Press, Rowing, Triceps, and Biceps    Lila received manual therapy techniques for 0  minutes. The following activities were included:    Pt given cold pack for 5 minutes to low back in z lie.    Patient Education and Home Exercises   Home exercises include:  LTR X 10  Open book X 10  Bridges X 10  Clamshells X 10   Extension in lying X 10   Extension in standing  X 10  Piriformis stretch  Cardio program (V5): - 1/31/24   Lifting education (V11): - 3/20/24 Dos and Don'ts of Lifting reviewed  Posture/Lumbar roll: not yet, reminded her of value and ice packs 1/9/24  Fridge Magnet Discharge handout (date given): -  Equipment at home/gym membership: [employer]    Education provided:   - cues w/ex's  - MedX performance  - Precor ex performance  - 1/31/24 Availability of Health coaching.    Written Home Exercises Provided: yes. Added piriformis stretch 3/1/24  Exercises were reviewed and Lila was able to demonstrate them prior to the end of the session.  Lila demonstrated good  understanding of the education provided.     See EMR under Patient Instructions for exercises provided prior visit     Assessment   Lila presents to therapy today reporting mild left sided low back and hip pain. Gave lifting handout and education today, and she was able to demonstrate good technique and awareness for lifting floor to waist and waist to shoulder, as well as practice with sled push to mimic pushing a wheelchair at work. Maintained resistance on Lumbar MedX at 52 ft.lbs, and she was able to complete 15 repetitions at an exertion level of 4/10. She noted exercises felt harder today, possibly due to having a week between sessions. PT to continue to progress as tolerated per [facility] protocol.          Patient is making progress towards established goals.  Pt will continue to benefit from skilled outpatient physical therapy to address the deficits stated in the impairment chart, provide pt/family education and to maximize pt's level of independence in the home and community environment.     Anticipated Barriers for therapy: nil  Pt's spiritual, cultural and educational needs considered and pt agreeable to plan of care and goals as stated below:       Short term goals:  6 weeks or 10 visits   - Pt will demonstrate increased lumbar MedX ROM by at least 3 degrees from the initial ROM value with improvements noted in functional ROM and ability to perform ADLs. MET  - Pt will demonstrate increased MedX average isometric strength value by 25% from initial test resulting in improved ability to perform bending, lifting, and carrying activities safely, confidently. Appropriate and  Ongoing  - Pt will report a reduction in worst pain score by 1-2 points for improved tolerance for bending activity. MET  - Pt able to perform HEP correctly with minimal cueing or supervision from therapist to encourage independent management of symptoms. MET     Long term goals: 10 weeks or 20 visits   - Pt will demonstrate increased lumbar MedX ROM by at least 6 degrees from initial ROM value, resulting in improved ability to perform functional forward bending while standing and sitting. Appropriate and Ongoing  - Pt will demonstrate increased MedX average isometric strength value by 50% from initial test resulting in improved ability to perform bending, lifting, and carrying activities safely and confidently. Appropriate and Ongoing  - Pt to demonstrate ability to independently control and reduce their pain through posture positioning and mechanical movements throughout a typical day. Appropriate and Ongoing  - Pt will demonstrate reduced pain and improved functional outcomes as reported on the Oswestry Disability Index by reaching a score of 10% or less in order to demonstrate subjective improvement in pt's condition. . Appropriate and Ongoing  - Pt will demonstrate independence with the HEP at discharge. Appropriate and Ongoing  - Pt will be able to work out without increase in back symptoms. (patient goal) Appropriate and Ongoing     Plan   Continue with established Plan of Care towards established PT goals.     Therapist: Janee Castro, PT  Co-treated with Clotilde Lew PT    3/20/2024

## 2024-03-22 ENCOUNTER — CLINICAL SUPPORT (OUTPATIENT)
Dept: REHABILITATION | Facility: HOSPITAL | Age: 54
End: 2024-03-22
Payer: COMMERCIAL

## 2024-03-22 DIAGNOSIS — R29.898 DECREASED STRENGTH OF TRUNK AND BACK: Primary | ICD-10-CM

## 2024-03-22 PROCEDURE — 97750 PHYSICAL PERFORMANCE TEST: CPT | Mod: 32

## 2024-03-22 NOTE — PROGRESS NOTES
[facility]  Physical Therapy Treatment Note     Name: Lila Pineda  Clinic Number: [id number]    Therapy Diagnosis:   Encounter Diagnosis   Name Primary?    Decreased strength of trunk and back Yes         Physician: Tammie Bejarano, *    Visit Date: 3/22/2024    Physician Orders: PT Eval and Treat  Medical Diagnosis from Referral: M54.50,G89.29 (ICD-10-CM) - Chronic right-sided low back pain without sciatica   Evaluation Date: 1/4/2024  Authorization Period Expiration: 12/5/2024  Plan of Care Expiration: 4/4/2024  Reassessment Due: 4/6/2024   Visit # / Visits authorized: 12/20 (employee benefit )  MedX testing visit 2     Time In: 10:05 AM  Time Out: 11:00 AM  Total Billable Time: 50 minutes  INSURANCE and OUTCOMES: Program Benefit Group with Lumbar Outcomes (Oswestry and AQoL) 3/3     Precautions: standard     Pattern of pain determined: 1/movement responder      Subjective   Lila reports minimal lower back discomfort/stiffness R>L. States that she is dealing with work stress. States that she has been encouraged by her strength progress thus far.    Patient reports tolerating previous visit : No c/o  Patient reports their pain to be 2/10 on a 0-10 scale with 0 being no pain and 10 being the worst pain imaginable.  Pain Location: low back     Occupation: Nurse at [employer]   Leisure: going to park, spending time with family, would like to get back to working out , 3 adult children    Pt goals: perform job duties without pain, household chores without pain, return to working out     Objective      Lumbar  Isometric Testing on Med X equipment: Testing administered by PT    Test Initial Baseline Midpoint Final   Date 1/9/24 3/6/2024    ROM 6-42 deg 3-48 deg    Max Peak Torque 97  96    Min Peak Torque 47  62    Flex/Ext Ratio 2.1/1 1.5:1    % below normative data -46% 38%    % gain from initial test Not available visit 1 13%      MOVEMENT LOSS - Lumbar     Norms ROM Loss  Range of motion 2/7/2024 Range of motion 3/6/2024   Flexion Fingers touch toes, sacral angle >/= 70 deg, uniform spinal curvature, posterior weight shift  within functional limits Within functional limits Within functional limits   Extension ASIS surpasses toes, spine of scapulae surpasses heels, uniform spinal curve minimal loss Within functional limits Within functional limits   Side glide Right   within functional limits Within functional limits Within functional limits   Side glide Left   within functional limits Within functional limits Within functional limits   Rotation Right PT observes contralateral shoulder minimal loss Within functional limits Within functional limits   Rotation Left PT observes contralateral shoulder minimal loss Within functional limits Within functional limits      OUTCOMES SELECTION:   Program Patient Outcome Measures     Oswestry Score:  11/50 = 22% disability   Visit 6: 6/50 + 12% disability  Visit 10: 6/50 12% disability     AQoL Score:  3/36 = 8% disability  Visit 6: 2/36 = 6% disability         Treatment     Lila received the treatments listed below:      Medical MedX Treatment as follows:  Lila received neuromuscular education  to isolate and engage spinal stabilization musculature correctly for motor control and coordination to aid in function and posture for 10 minutes on the Medical Medx Machine.  Patient performed MedX dynamic exercise with emphasis on spinal muscular control using pacer throughout  active range of motion. Therapist assisted patient in achieving optimal exertion for neural reeducation and endurance training by using the  Karon Exertion Rating scale, by instructing the patient to aim for mid range of exertion, performing 15-20 repetitions, slowly, correctly,and safely        3/22/2024    10:30 AM   [facility]   Visit Number 12   VAS Pain Rating 2   Treadmill Time (in min.) 5 min   Time 5   Extension in Lying 10   Extension in  Standing 10   Flexion in Lying 10   Lumbar Flexion 48   Lumbar Extension 0   Lumbar Weight 52 lbs   Repetitions 18   Rating of Perceived Exertion 4       Lila participated in therapeutic exercises to develop strength, endurance, ROM, flexibility, posture, and core stabilization for 40 minutes including:    LTR x 10  DKTC x 10  Piriformis stretch 2 x 20 sec  TrA + SLR + 2# x 10  PPT + BKFO GTB x10 cueing to maintain TrA activation-NP  Bridges with 3 sec hold c/GTB x  20  Cat/cow x 10  Bird dog (UE/LE only) x 10 (cue for level pelvis)  EIL x 10  SOC x 10--NP  Extension in standing  x 10  + Paloff press with 10# x 15    Not today    Open book x 5 and x 5 with RTB resistance  (Sidelying plank) clamshells c/ GTB x 10 (held side plank due to muscle spasm at left scapula)    Peripheral muscle strengthening which included one set of 15-20 repetitions at a slow and controlled 10-13 second per rep pace focused on strengthening supporting musculature in order to improve body mechanics and functional mobility. Patient and therapist focused on proper form during treatment to ensure optimal strengthening of each targeted muscle group.  Machines utilized included:Torso rotation, Leg Ext, Hip Abd, Hip Add, and Leg Press:Leg Curl, Chest Press, Rowing, Triceps, and Biceps    Lila received manual therapy techniques for 0  minutes. The following activities were included:    Pt given cold pack for 5 minutes to low back in z lie.    Patient Education and Home Exercises   Home exercises include:  LTR X 10  Open book X 10  Bridges X 10  Clamshells X 10   Extension in lying X 10   Extension in standing  X 10  Piriformis stretch  Cardio program (V5): - 1/31/24   Lifting education (V11): - 3/20/24 Dos and Don'ts of Lifting reviewed  Posture/Lumbar roll: not yet, reminded her of value and ice packs 1/9/24  Fridge Magnet Discharge handout (date given): -  Equipment at home/gym membership: [employer]    Education provided:   - cues  w/ex's  - MedX performance  - Precor ex performance  - 1/31/24 Availability of Health coaching.    Written Home Exercises Provided: yes. Added piriformis stretch 3/1/24  Exercises were reviewed and Lila was able to demonstrate them prior to the end of the session.  Lila demonstrated good  understanding of the education provided.     See EMR under Patient Instructions for exercises provided prior visit     Assessment   Lila returns with mild report of R lower back and R glute pain currently. Treatment continued with mobility, strengthening, and neuro re-education exercises. She was progressed to 2# for TrA + SLR, Full Bird dog ex and also added Paloff press for progressive strengthening. She was able to perform ex's including progressions without c/o. Lumbar MedX Extension ROM was increased to 0 degrees and resistance was maintained at 52 ft/lbs competing 18 reps with a RPE = 4/10. She was able to complete the full circuit of peripheral strengthening ex's without c/o. Will continue to progress per HB protocol and patient tolerance.      Patient is making progress towards established goals.  Pt will continue to benefit from skilled outpatient physical therapy to address the deficits stated in the impairment chart, provide pt/family education and to maximize pt's level of independence in the home and community environment.     Anticipated Barriers for therapy: nil  Pt's spiritual, cultural and educational needs considered and pt agreeable to plan of care and goals as stated below:     Short term goals:  6 weeks or 10 visits   - Pt will demonstrate increased lumbar MedX ROM by at least 3 degrees from the initial ROM value with improvements noted in functional ROM and ability to perform ADLs. MET  - Pt will demonstrate increased MedX average isometric strength value by 25% from initial test resulting in improved ability to perform bending, lifting, and carrying activities safely, confidently. Appropriate and  Ongoing  - Pt will report a reduction in worst pain score by 1-2 points for improved tolerance for bending activity. MET  - Pt able to perform HEP correctly with minimal cueing or supervision from therapist to encourage independent management of symptoms. MET     Long term goals: 10 weeks or 20 visits   - Pt will demonstrate increased lumbar MedX ROM by at least 6 degrees from initial ROM value, resulting in improved ability to perform functional forward bending while standing and sitting. Appropriate and Ongoing  - Pt will demonstrate increased MedX average isometric strength value by 50% from initial test resulting in improved ability to perform bending, lifting, and carrying activities safely and confidently. Appropriate and Ongoing  - Pt to demonstrate ability to independently control and reduce their pain through posture positioning and mechanical movements throughout a typical day. Appropriate and Ongoing  - Pt will demonstrate reduced pain and improved functional outcomes as reported on the Oswestry Disability Index by reaching a score of 10% or less in order to demonstrate subjective improvement in pt's condition. . Appropriate and Ongoing  - Pt will demonstrate independence with the HEP at discharge. Appropriate and Ongoing  - Pt will be able to work out without increase in back symptoms. (patient goal) Appropriate and Ongoing     Plan   Continue with established Plan of Care towards established PT goals.     Therapist: Daniele Lin, PTA    3/22/2024

## 2024-03-28 ENCOUNTER — CLINICAL SUPPORT (OUTPATIENT)
Dept: REHABILITATION | Facility: HOSPITAL | Age: 54
End: 2024-03-28
Payer: COMMERCIAL

## 2024-03-28 DIAGNOSIS — R29.898 DECREASED STRENGTH OF TRUNK AND BACK: Primary | ICD-10-CM

## 2024-03-28 PROCEDURE — 97750 PHYSICAL PERFORMANCE TEST: CPT | Mod: 32

## 2024-03-28 NOTE — PROGRESS NOTES
" [facility]  Physical Therapy Treatment Note     Name: Lila Pineda  Clinic Number: [id number]    Therapy Diagnosis:   Encounter Diagnosis   Name Primary?    Decreased strength of trunk and back Yes       Physician: Tammie Bejarano, *    Visit Date: 3/28/2024    Physician Orders: PT Eval and Treat  Medical Diagnosis from Referral: M54.50,G89.29 (ICD-10-CM) - Chronic right-sided low back pain without sciatica   Evaluation Date: 1/4/2024  Authorization Period Expiration: 12/5/2024  Plan of Care Expiration: 4/4/2024  Reassessment Due: 4/6/2024   Visit # / Visits authorized: 13/20 (employee benefit )  MedX testing visit 2     Time In: 130  Time Out: 230  Total Billable Time: 55 minutes  INSURANCE and OUTCOMES: Program Benefit Group with Lumbar Outcomes (Oswestry and AQoL) 3/3     Precautions: standard     Pattern of pain determined: 1/movement responder      Subjective   Lila reports her sciatic pain is much better since she started.  Pt reports the LTR/open book and bridges and piriformis stretches are her "go to"exercises  Patient reports tolerating previous visit : No c/o  Patient reports their pain to be 2/10 on a 0-10 scale with 0 being no pain and 10 being the worst pain imaginable.  Pain Location: low back     Occupation: Nurse at [employer]   Leisure: going to park, spending time with family, would like to get back to working out , 3 adult children    Pt goals: perform job duties without pain, household chores without pain, return to working out     Objective      Lumbar  Isometric Testing on Med X equipment: Testing administered by PT    Test Initial Baseline Midpoint Final   Date 1/9/24 3/6/2024    ROM 6-42 deg 3-48 deg    Max Peak Torque 97  96    Min Peak Torque 47  62    Flex/Ext Ratio 2.1/1 1.5:1    % below normative data -46% 38%    % gain from initial test Not available visit 1 13%      MOVEMENT LOSS - Lumbar    Norms ROM Loss  Range of motion " 2/7/2024 Range of motion 3/6/2024   Flexion Fingers touch toes, sacral angle >/= 70 deg, uniform spinal curvature, posterior weight shift  within functional limits Within functional limits Within functional limits   Extension ASIS surpasses toes, spine of scapulae surpasses heels, uniform spinal curve minimal loss Within functional limits Within functional limits   Side glide Right   within functional limits Within functional limits Within functional limits   Side glide Left   within functional limits Within functional limits Within functional limits   Rotation Right PT observes contralateral shoulder minimal loss Within functional limits Within functional limits   Rotation Left PT observes contralateral shoulder minimal loss Within functional limits Within functional limits      OUTCOMES SELECTION:   Program Patient Outcome Measures     Oswestry Score:  11/50 = 22% disability   Visit 6: 6/50 + 12% disability  Visit 10: 6/50 12% disability     AQoL Score:  3/36 = 8% disability  Visit 6: 2/36 = 6% disability         Treatment     Lila received the treatments listed below:      Medical MedX Treatment as follows:  Lila received neuromuscular education  to isolate and engage spinal stabilization musculature correctly for motor control and coordination to aid in function and posture for 10 minutes on the Medical Medx Machine.  Patient performed MedX dynamic exercise with emphasis on spinal muscular control using pacer throughout  active range of motion. Therapist assisted patient in achieving optimal exertion for neural reeducation and endurance training by using the  Karon Exertion Rating scale, by instructing the patient to aim for mid range of exertion, performing 15-20 repetitions, slowly, correctly,and safely        3/28/2024     4:00 PM   [facility]   Visit Number 13   VAS Pain Rating 2   Time 5   Extension in Lying 10   Extension in Standing 10   Flexion in Lying 10   Lumbar Weight 52 lbs   Repetitions  20   Rating of Perceived Exertion 4   Ice - Z Lie (in min.) 5       Lila participated in therapeutic exercises to develop strength, endurance, ROM, flexibility, posture, and core stabilization for 50 minutes including:    LTR x 10  DKTC x 10  Piriformis stretch 2 x 20 sec  SL clams  Bridges with 3 sec hold c/GTB x  20  Cat/cow x 10  Bird dog (UE/LE only) x 10 (cue for level pelvis)  EIL x 10;  Extension in standing  x 10  + Paloff press with 10# x 15  Open book x 5 and x 5 with RTB resistance   clamshells c/ GTB x15     Peripheral muscle strengthening which included one set of 15-20 repetitions at a slow and controlled 10-13 second per rep pace focused on strengthening supporting musculature in order to improve body mechanics and functional mobility. Patient and therapist focused on proper form during treatment to ensure optimal strengthening of each targeted muscle group.  Machines utilized included:Torso rotation, Leg Ext, Hip Abd, Hip Add, and Leg Press:Leg Curl, Chest Press, Rowing, Triceps, and Biceps    Lila received manual therapy techniques for 0  minutes. The following activities were included:    Pt given cold pack for 5 minutes to low back in z lie.    Patient Education and Home Exercises   Home exercises include:  LTR X 10  Open book X 10  Bridges X 10  Clamshells X 10   Extension in lying X 10   Extension in standing  X 10  Piriformis stretch  Cardio program (V5): - 1/31/24   Lifting education (V11): - 3/20/24 Dos and Don'ts of Lifting reviewed  Posture/Lumbar roll: not yet, reminded her of value and ice packs 1/9/24  Fridge Magnet Discharge handout (date given): -  Equipment at home/gym membership: [employer]    Education provided:   - cues w/ex's  - MedX performance  - Precor ex performance  - 1/31/24 Availability of Health coaching.    Written Home Exercises Provided: yes. Added piriformis stretch 3/1/24  Exercises were reviewed and Lila was able to demonstrate them prior to the end of the  session.  Lila demonstrated good  understanding of the education provided.     See EMR under Patient Instructions for exercises provided prior visit     Assessment   Lila returns with mild report of R LB/glut pain. Pt reports she is getting better and has less sciatic pain since starting the program.  Treatment continued with mobility, strengthening, and neuro re-education exercises. Pt required tactile cueing for bird dogs to maintain neutral spine. Lumbar MedX  resistance was maintained at 52 ft/lbs competing 20reps with a RPE = 4/10. Increase 5% next visit  She was able to complete the full circuit of peripheral strengthening ex's without c/o. Will continue to progress per HB protocol and patient tolerance.      Patient is making progress towards established goals.  Pt will continue to benefit from skilled outpatient physical therapy to address the deficits stated in the impairment chart, provide pt/family education and to maximize pt's level of independence in the home and community environment.     Anticipated Barriers for therapy: nil  Pt's spiritual, cultural and educational needs considered and pt agreeable to plan of care and goals as stated below:     Short term goals:  6 weeks or 10 visits   - Pt will demonstrate increased lumbar MedX ROM by at least 3 degrees from the initial ROM value with improvements noted in functional ROM and ability to perform ADLs. MET  - Pt will demonstrate increased MedX average isometric strength value by 25% from initial test resulting in improved ability to perform bending, lifting, and carrying activities safely, confidently. Appropriate and Ongoing  - Pt will report a reduction in worst pain score by 1-2 points for improved tolerance for bending activity. MET  - Pt able to perform HEP correctly with minimal cueing or supervision from therapist to encourage independent management of symptoms. MET     Long term goals: 10 weeks or 20 visits   - Pt will demonstrate increased  lumbar MedX ROM by at least 6 degrees from initial ROM value, resulting in improved ability to perform functional forward bending while standing and sitting. Appropriate and Ongoing  - Pt will demonstrate increased MedX average isometric strength value by 50% from initial test resulting in improved ability to perform bending, lifting, and carrying activities safely and confidently. Appropriate and Ongoing  - Pt to demonstrate ability to independently control and reduce their pain through posture positioning and mechanical movements throughout a typical day. Appropriate and Ongoing  - Pt will demonstrate reduced pain and improved functional outcomes as reported on the Oswestry Disability Index by reaching a score of 10% or less in order to demonstrate subjective improvement in pt's condition. . Appropriate and Ongoing  - Pt will demonstrate independence with the HEP at discharge. Appropriate and Ongoing  - Pt will be able to work out without increase in back symptoms. (patient goal) Appropriate and Ongoing     Plan   Continue with established Plan of Care towards established PT goals.     Therapist: Clotilde Lew, PT    3/28/2024

## 2024-04-03 ENCOUNTER — CLINICAL SUPPORT (OUTPATIENT)
Dept: REHABILITATION | Facility: HOSPITAL | Age: 54
End: 2024-04-03
Payer: COMMERCIAL

## 2024-04-03 DIAGNOSIS — R29.898 DECREASED STRENGTH OF TRUNK AND BACK: Primary | ICD-10-CM

## 2024-04-03 PROCEDURE — 97750 PHYSICAL PERFORMANCE TEST: CPT | Mod: 32

## 2024-04-03 NOTE — PROGRESS NOTES
[facility]  Physical Therapy Treatment Note     Name: Lila Pineda  Clinic Number: [id number]    Therapy Diagnosis:   Encounter Diagnosis   Name Primary?    Decreased strength of trunk and back Yes         Physician: Tammie Bejarano, *    Visit Date: 4/3/2024    Physician Orders: PT Eval and Treat  Medical Diagnosis from Referral: M54.50,G89.29 (ICD-10-CM) - Chronic right-sided low back pain without sciatica   Evaluation Date: 1/4/2024  Authorization Period Expiration: 12/5/2024  Plan of Care Expiration: 4/4/2024  Reassessment Due: 5/3/24  Visit # / Visits authorized: 14/20 (employee benefit )attempt ROM increase on visit 15  MedX testing visit 2     Time In:910(pt late)  Time Out: 1010  Total Billable Time: 55 minutes  INSURANCE and OUTCOMES: Program Benefit Group with Lumbar Outcomes (Oswestry and AQoL) 3/3     Precautions: standard     Pattern of pain determined: 1/movement responder      Subjective   Lila reports she is doing better since starting with the program  Patient reports tolerating previous visit : No c/o  Patient reports their pain to be 0/10 on a 0-10 scale with 0 being no pain and 10 being the worst pain imaginable.  Pain Location: low back     Occupation: Nurse at [employer]   Leisure: going to park, spending time with family, would like to get back to working out , 3 adult children    Pt goals: perform job duties without pain, household chores without pain, return to working out     Objective      Lumbar  Isometric Testing on Med X equipment: Testing administered by PT    Test Initial Baseline Midpoint Final   Date 1/9/24 3/6/2024    ROM 6-42 deg 3-48 deg    Max Peak Torque 97  96    Min Peak Torque 47  62    Flex/Ext Ratio 2.1/1 1.5:1    % below normative data -46% 38%    % gain from initial test Not available visit 1 13%      MOVEMENT LOSS - Lumbar    Norms ROM Loss  Range of motion 2/7/2024 Range of motion 3/6/2024   Flexion Fingers  touch toes, sacral angle >/= 70 deg, uniform spinal curvature, posterior weight shift  within functional limits Within functional limits Within functional limits   Extension ASIS surpasses toes, spine of scapulae surpasses heels, uniform spinal curve minimal loss Within functional limits Within functional limits   Side glide Right   within functional limits Within functional limits Within functional limits   Side glide Left   within functional limits Within functional limits Within functional limits   Rotation Right PT observes contralateral shoulder minimal loss Within functional limits Within functional limits   Rotation Left PT observes contralateral shoulder minimal loss Within functional limits Within functional limits      OUTCOMES SELECTION:   Program Patient Outcome Measures     Oswestry Score:  11/50 = 22% disability   Visit 6: 6/50 + 12% disability  Visit 10: 6/50 12% disability     AQoL Score:  3/36 = 8% disability  Visit 6: 2/36 = 6% disability         Treatment     Lila received the treatments listed below:      Medical MedX Treatment as follows:  Lila received neuromuscular education  to isolate and engage spinal stabilization musculature correctly for motor control and coordination to aid in function and posture for 10 minutes on the Medical Medx Machine.  Patient performed MedX dynamic exercise with emphasis on spinal muscular control using pacer throughout  active range of motion. Therapist assisted patient in achieving optimal exertion for neural reeducation and endurance training by using the  Karon Exertion Rating scale, by instructing the patient to aim for mid range of exertion, performing 15-20 repetitions, slowly, correctly,and safely        4/3/2024     9:36 AM   [facility]   Visit Number 14   VAS Pain Rating 0   Time 5   Extension in Lying 10   Extension in Standing 10   Flexion in Lying 10   Lumbar Weight 56 lbs   Repetitions 18   Rating of Perceived Exertion 4   Ice - Z Lie (in  min.) 5       Lila participated in therapeutic exercises to develop strength, endurance, ROM, flexibility, posture, and core stabilization for 50 minutes including:    LTR x 10  DKTC x 10  Piriformis stretch 2 x 20 sec  SL clams 15x/ GTB  Bridges with 3 sec hold c/GTB x  20  Cat/cow x 10  Bird dog10x  EIL x 10;  Extension in standing  x 10  + Paloff press with 10# x 15  Open book x\10x RTB resistance      Peripheral muscle strengthening which included one set of 15-20 repetitions at a slow and controlled 10-13 second per rep pace focused on strengthening supporting musculature in order to improve body mechanics and functional mobility. Patient and therapist focused on proper form during treatment to ensure optimal strengthening of each targeted muscle group.  Machines utilized included:Torso rotation, Leg Ext, Hip Abd, Hip Add, and Leg Press:Leg Curl, Chest Press, Rowing, Triceps, and Biceps    Lila received manual therapy techniques for 0  minutes. The following activities were included:    Pt given cold pack for 5 minutes to low back in z lie.    Patient Education and Home Exercises   Home exercises include:  LTR X 10  Open book X 10  Bridges X 10  Clamshells X 10   Extension in lying X 10   Extension in standing  X 10  Piriformis stretch  Cardio program (V5): - 1/31/24   Lifting education (V11): - 3/20/24 Dos and Don'ts of Lifting reviewed  Posture/Lumbar roll: not yet, reminded her of value and ice packs 1/9/24  Fridge Magnet Discharge handout (date given): -  Equipment at home/gym membership: [employer]    Education provided:   - cues w/ex's  - MedX performance  - Precor ex performance  - 1/31/24 Availability of Health coaching.    Written Home Exercises Provided: yes. Added piriformis stretch 3/1/24  Exercises were reviewed and Lila was able to demonstrate them prior to the end of the session.  Lila demonstrated good  understanding of the education provided.     See EMR under Patient Instructions for  exercises provided prior visit     Assessment   Lila returns with very minimal sciatic discomfort. Pt also reports pain occurs less often. Treatment continued with mobility, strengthening, and neuro re-education exercises. Pt continues with bird dogs with some tactile cueing, added reps for SL clams to increase challenge.  Lumbar MedX  resistance was increased to 56 ft/lbs competing 18reps with a RPE = 4/10. Attempt increase in ROM next visit.  Noted pt had ease of motion >than 48 degrees today.  She was able to complete the full circuit of peripheral strengthening ex's without c/o. Will continue to progress per HB protocol and patient tolerance.      Patient is making progress towards established goals.  Pt will continue to benefit from skilled outpatient physical therapy to address the deficits stated in the impairment chart, provide pt/family education and to maximize pt's level of independence in the home and community environment.     Anticipated Barriers for therapy: nil  Pt's spiritual, cultural and educational needs considered and pt agreeable to plan of care and goals as stated below:     Short term goals:  6 weeks or 10 visits   - Pt will demonstrate increased lumbar MedX ROM by at least 3 degrees from the initial ROM value with improvements noted in functional ROM and ability to perform ADLs. MET  - Pt will demonstrate increased MedX average isometric strength value by 25% from initial test resulting in improved ability to perform bending, lifting, and carrying activities safely, confidently. Appropriate and Ongoing  - Pt will report a reduction in worst pain score by 1-2 points for improved tolerance for bending activity. MET  - Pt able to perform HEP correctly with minimal cueing or supervision from therapist to encourage independent management of symptoms. MET     Long term goals: 10 weeks or 20 visits   - Pt will demonstrate increased lumbar MedX ROM by at least 6 degrees from initial ROM value,  resulting in improved ability to perform functional forward bending while standing and sitting. Appropriate and Ongoing  - Pt will demonstrate increased MedX average isometric strength value by 50% from initial test resulting in improved ability to perform bending, lifting, and carrying activities safely and confidently. Appropriate and Ongoing  - Pt to demonstrate ability to independently control and reduce their pain through posture positioning and mechanical movements throughout a typical day. Appropriate and Ongoing  - Pt will demonstrate reduced pain and improved functional outcomes as reported on the Oswestry Disability Index by reaching a score of 10% or less in order to demonstrate subjective improvement in pt's condition. . Appropriate and Ongoing  - Pt will demonstrate independence with the HEP at discharge. Appropriate and Ongoing  - Pt will be able to work out without increase in back symptoms. (patient goal) Appropriate and Ongoing     Plan   Continue with established Plan of Care towards established PT goals.     Therapist: Clotilde Lew, PT    4/3/2024

## 2024-04-12 ENCOUNTER — CLINICAL SUPPORT (OUTPATIENT)
Dept: REHABILITATION | Facility: HOSPITAL | Age: 54
End: 2024-04-12
Payer: COMMERCIAL

## 2024-04-12 ENCOUNTER — DOCUMENTATION ONLY (OUTPATIENT)
Dept: REHABILITATION | Facility: HOSPITAL | Age: 54
End: 2024-04-12
Payer: COMMERCIAL

## 2024-04-12 DIAGNOSIS — R29.898 DECREASED STRENGTH OF TRUNK AND BACK: Primary | ICD-10-CM

## 2024-04-12 PROCEDURE — 97750 PHYSICAL PERFORMANCE TEST: CPT | Mod: 32

## 2024-04-12 NOTE — PLAN OF CARE
Outpatient Therapy Updated Plan of Care     Visit Date: 4/3/2024    Name: Lila Pineda  Clinic Number: [id number]    Therapy Diagnosis:   Encounter Diagnosis   Name Primary?    Decreased strength of trunk and back Yes     Physician: Tammie Bejarano, *    Visit Date: 4/3/2024     Physician Orders: PT Eval and Treat  Medical Diagnosis from Referral: M54.50,G89.29 (ICD-10-CM) - Chronic right-sided low back pain without sciatica   Evaluation Date: 1/4/2024  Authorization Period Expiration: 12/5/2024  Plan of Care Expiration: 5/4/24  Reassessment Due: 5/3/24  Visit # / Visits authorized: 14/20 (employee benefit )attempt ROM increase on visit 15  MedX testing visit 2     Time In:910(pt late)  Time Out: 1010  Total Billable Time: 55 minutes  INSURANCE and OUTCOMES: Program Benefit Group with Lumbar Outcomes (Oswestry and AQoL) 3/3     Precautions: standard     Pattern of pain determined: 1/movement responder   Subjective     Update: Lila reports she is doing better since starting with the program  Patient reports tolerating previous visit : No c/o  Patient reports their pain to be 0/10 on a 0-10 scale with 0 being no pain and 10 being the worst pain imaginable.  Pain Location: low back     Occupation: Nurse at [employer]   Leisure: going to park, spending time with family, would like to get back to working out , 3 adult children    Pt goals: perform job duties without pain, household chores without pain, return to working out     Objective     Update: Lumbar  Isometric Testing on Med X equipment: Testing administered by PT     Test Initial Baseline Midpoint Final   Date 1/9/24 3/6/2024     ROM 6-42 deg 3-48 deg     Max Peak Torque 97  96     Min Peak Torque 47  62     Flex/Ext Ratio 2.1/1 1.5:1     % below normative data -46% 38%     % gain from initial test Not available visit 1 13%       MOVEMENT LOSS - Lumbar    Norms ROM Loss  Range of motion 2/7/2024 Range of motion 3/6/2024   Flexion Fingers touch  toes, sacral angle >/= 70 deg, uniform spinal curvature, posterior weight shift  within functional limits Within functional limits Within functional limits   Extension ASIS surpasses toes, spine of scapulae surpasses heels, uniform spinal curve minimal loss Within functional limits Within functional limits   Side glide Right   within functional limits Within functional limits Within functional limits   Side glide Left   within functional limits Within functional limits Within functional limits   Rotation Right PT observes contralateral shoulder minimal loss Within functional limits Within functional limits   Rotation Left PT observes contralateral shoulder minimal loss Within functional limits Within functional limits      OUTCOMES SELECTION:   Program Patient Outcome Measures     Oswestry Score:  11/50 = 22% disability   Visit 6: 6/50 + 12% disability  Visit 10: 6/50 12% disability     AQoL Score:  3/36 = 8% disability  Visit 6: 2/36 = 6% disability          Assessment     Update: Pt is making good progress with skilled PT.  Pt increased strength level 13% as per Med X testing and now demonstrates WFL lumbar ROM and decreased TAB score from 22 to 12% indicating progress with functional mobility.  Pt will continue to receive benefit from our program  Short term goals:  6 weeks or 10 visits   - Pt will demonstrate increased lumbar MedX ROM by at least 3 degrees from the initial ROM value with improvements noted in functional ROM and ability to perform ADLs. MET  - Pt will demonstrate increased MedX average isometric strength value by 25% from initial test resulting in improved ability to perform bending, lifting, and carrying activities safely, confidently. Appropriate and Ongoing  - Pt will report a reduction in worst pain score by 1-2 points for improved tolerance for bending activity. MET  - Pt able to perform HEP correctly with minimal cueing or supervision from therapist to encourage independent management of  "symptoms. MET     Long term goals: 10 weeks or 20 visits   - Pt will demonstrate increased lumbar MedX ROM by at least 6 degrees from initial ROM value, resulting in improved ability to perform functional forward bending while standing and sitting. Appropriate and Ongoing  - Pt will demonstrate increased MedX average isometric strength value by 50% from initial test resulting in improved ability to perform bending, lifting, and carrying activities safely and confidently. Appropriate and Ongoing  - Pt to demonstrate ability to independently control and reduce their pain through posture positioning and mechanical movements throughout a typical day. Appropriate and Ongoing  - Pt will demonstrate reduced pain and improved functional outcomes as reported on the Oswestry Disability Index by reaching a score of 10% or less in order to demonstrate subjective improvement in pt's condition. . Appropriate and Ongoing  - Pt will demonstrate independence with the HEP at discharge. Appropriate and Ongoing  - Pt will be able to work out without increase in back symptoms. (patient goal) Appropriate and Ongoing     Plan     Updated Certification Period: 4/4/24to 5/4/24  Recommended Treatment Plan: 1-2 times per week for 4 weeks: Manual Therapy, Moist Heat/ Ice, Neuromuscular Re-ed, Patient Education, Self Care, Therapeutic Activities, and Therapeutic Exercise  Other Recommendations: N/A    Outpatient physical therapy 2x week for 13 weeks or 20 visits to include the following:   - Patient education  - Therapeutic exercise  - Manual therapy  - Performance testing   - Neuromuscular Re-education  - Therapeutic activity   - Modalities    Pt may be seen by PTA as part of the rehabilitation team.     Therapist: Clotilde Lew, PT  4/12/2024    "I certify the need for these services furnished under this plan of treatment and while under my care."    ____________________________________  Physician/Referring " Practitioner    _______________  Date of Signature    Clotilde Lew, PT  4/3/2024      I CERTIFY THE NEED FOR THESE SERVICES FURNISHED UNDER THIS PLAN OF TREATMENT AND WHILE UNDER MY CARE    Physician's comments:        Physician's Signature: ___________________________________________________

## 2024-04-12 NOTE — PROGRESS NOTES
[facility]  Physical Therapy Treatment Note     Name: Lila Pineda  Clinic Number: [id number]    Therapy Diagnosis:   Encounter Diagnosis   Name Primary?    Decreased strength of trunk and back Yes     Physician: Tammie Bejarano, *    Visit Date: 4/12/2024    Physician Orders: PT Eval and Treat  Medical Diagnosis from Referral: M54.50,G89.29 (ICD-10-CM) - Chronic right-sided low back pain without sciatica   Evaluation Date: 1/4/2024  Authorization Period Expiration: 12/5/2024  Plan of Care Expiration: 5/4/2024  Reassessment Due: 5/3/24  Visit # / Visits authorized: 15/20 (employee benefit )  MedX testing visit 2     Time In: 1:35 PM  Time Out: 2:25 PM  Total Billable Time: 50 minutes  INSURANCE and OUTCOMES: Program Benefit Group with Lumbar Outcomes (Oswestry and AQoL) 3/3     Precautions: standard     Pattern of pain determined: 1/movement responder      Subjective   Lila reports that she is rushed to get here but is without c/o pain currently. She reports improved overall activity tolerance to standing at work.    Patient reports tolerating previous visit : No c/o  Patient reports their pain to be 0/10 on a 0-10 scale with 0 being no pain and 10 being the worst pain imaginable.  Pain Location: low back     Occupation: Nurse at [employer]   Leisure: going to park, spending time with family, would like to get back to working out , 3 adult children    Pt goals: perform job duties without pain, household chores without pain, return to working out     Objective      Lumbar  Isometric Testing on Med X equipment: Testing administered by PT    Test Initial Baseline Midpoint Final   Date 1/9/24 3/6/2024    ROM 6-42 deg 3-48 deg    Max Peak Torque 97  96    Min Peak Torque 47  62    Flex/Ext Ratio 2.1/1 1.5:1    % below normative data -46% 38%    % gain from initial test Not available visit 1 13%      MOVEMENT LOSS - Lumbar    Norms ROM Loss  Range of motion  2/7/2024 Range of motion 3/6/2024   Flexion Fingers touch toes, sacral angle >/= 70 deg, uniform spinal curvature, posterior weight shift  within functional limits Within functional limits Within functional limits   Extension ASIS surpasses toes, spine of scapulae surpasses heels, uniform spinal curve minimal loss Within functional limits Within functional limits   Side glide Right   within functional limits Within functional limits Within functional limits   Side glide Left   within functional limits Within functional limits Within functional limits   Rotation Right PT observes contralateral shoulder minimal loss Within functional limits Within functional limits   Rotation Left PT observes contralateral shoulder minimal loss Within functional limits Within functional limits      OUTCOMES SELECTION:   Program Patient Outcome Measures     Oswestry Score:  11/50 = 22% disability   Visit 6: 6/50 + 12% disability  Visit 10: 6/50 12% disability     AQoL Score:  3/36 = 8% disability  Visit 6: 2/36 = 6% disability         Treatment     Lila received the treatments listed below:      Medical MedX Treatment as follows:  Lila received neuromuscular education  to isolate and engage spinal stabilization musculature correctly for motor control and coordination to aid in function and posture for 10 minutes on the Medical Medx Machine.  Patient performed MedX dynamic exercise with emphasis on spinal muscular control using pacer throughout  active range of motion. Therapist assisted patient in achieving optimal exertion for neural reeducation and endurance training by using the  Karon Exertion Rating scale, by instructing the patient to aim for mid range of exertion, performing 15-20 repetitions, slowly, correctly,and safely        4/12/2024     1:57 PM   [facility] - Short   Visit Number 15   VAS Pain Rating 0   Extension in Lying 10   Extension in Standing 10   Flexion in Lying 10   Lumbar Flexion 51   Lumbar Extension  0   Lumbar Weight 56 lbs   Repetitions 20   Rating of Perceived Exertion 3.5       Lila participated in therapeutic exercises to develop strength, endurance, ROM, flexibility, posture, and core stabilization for 40 minutes including:    LTR x 10  DKTC x 10  Piriformis stretch 2 x 20 sec  Open book 10x RTB resistance  SL clams 15x/ BTB  Bridges with 3 sec hold c/BTB x  20  Cat/cow x 10  Bird dog10x (verbal and tactile cues for level pelvis)  EIL x 10  Extension in standing  x 10  Paloff press with 10# x 15    Peripheral muscle strengthening which included one set of 15-20 repetitions at a slow and controlled 10-13 second per rep pace focused on strengthening supporting musculature in order to improve body mechanics and functional mobility. Patient and therapist focused on proper form during treatment to ensure optimal strengthening of each targeted muscle group.  Machines utilized included:Torso rotation, Leg Ext, Hip Abd, Hip Add, and Leg Press:Leg Curl, Chest Press, Rowing, Triceps, and Biceps    Lila received manual therapy techniques for 0  minutes. The following activities were included:    Pt given cold pack for 5 minutes to low back in z lie.    Patient Education and Home Exercises   Home exercises include:  LTR X 10  Open book X 10  Bridges X 10  Clamshells X 10   Extension in lying X 10   Extension in standing  X 10  Piriformis stretch  Cardio program (V5): - 1/31/24   Lifting education (V11): - 3/20/24 Dos and Don'ts of Lifting reviewed  Posture/Lumbar roll: not yet, reminded her of value and ice packs 1/9/24  Fridge Magnet Discharge handout (date given): -  Equipment at home/gym membership: [employer]    Education provided:   - cues w/ex's  - MedX performance  - Precor ex performance  - 1/31/24 Availability of Health coaching.    Written Home Exercises Provided: yes. Added piriformis stretch 3/1/24  Exercises were reviewed and Lila was able to demonstrate them prior to the end of the session.   Lila demonstrated good  understanding of the education provided.     See EMR under Patient Instructions for exercises provided prior visit     Assessment   Lila returns without c/o pain currently. Treatment continued with mobility, strengthening, and neuro re-education exercises. She was progressed to BTB resistance for bridging w/band and clamshells performing without c/o. Lumbar MedX Flexion ROM was increased to 51 degrees and resistance was maintained at 56 ft/lbs progressing to complete 20 reps with a RPE = 3.5/10. She was able to complete the full circuit of peripheral strengthening ex's without c/o. Will continue to progress per HB protocol and patient tolerance.      Patient is making progress towards established goals.  Pt will continue to benefit from skilled outpatient physical therapy to address the deficits stated in the impairment chart, provide pt/family education and to maximize pt's level of independence in the home and community environment.     Anticipated Barriers for therapy: nil  Pt's spiritual, cultural and educational needs considered and pt agreeable to plan of care and goals as stated below:     Short term goals:  6 weeks or 10 visits   - Pt will demonstrate increased lumbar MedX ROM by at least 3 degrees from the initial ROM value with improvements noted in functional ROM and ability to perform ADLs. MET  - Pt will demonstrate increased MedX average isometric strength value by 25% from initial test resulting in improved ability to perform bending, lifting, and carrying activities safely, confidently. Appropriate and Ongoing  - Pt will report a reduction in worst pain score by 1-2 points for improved tolerance for bending activity. MET  - Pt able to perform HEP correctly with minimal cueing or supervision from therapist to encourage independent management of symptoms. MET     Long term goals: 10 weeks or 20 visits   - Pt will demonstrate increased lumbar MedX ROM by at least 6 degrees from  initial ROM value, resulting in improved ability to perform functional forward bending while standing and sitting. Appropriate and Ongoing  - Pt will demonstrate increased MedX average isometric strength value by 50% from initial test resulting in improved ability to perform bending, lifting, and carrying activities safely and confidently. Appropriate and Ongoing  - Pt to demonstrate ability to independently control and reduce their pain through posture positioning and mechanical movements throughout a typical day. Appropriate and Ongoing  - Pt will demonstrate reduced pain and improved functional outcomes as reported on the Oswestry Disability Index by reaching a score of 10% or less in order to demonstrate subjective improvement in pt's condition. . Appropriate and Ongoing  - Pt will demonstrate independence with the HEP at discharge. Appropriate and Ongoing  - Pt will be able to work out without increase in back symptoms. (patient goal) Appropriate and Ongoing     Plan   Continue with established Plan of Care towards established PT goals.     Therapist: Daniele Lin, XOCHITL    4/12/2024

## 2024-04-12 NOTE — PROGRESS NOTES
PT/PTA met face to face to discuss pt's treatment plan and progress towards established goals. Pt will be seen by a physical therapist minimally every 6th visit or every 30 days.      Daniele Lin PTA

## 2024-04-17 ENCOUNTER — CLINICAL SUPPORT (OUTPATIENT)
Dept: REHABILITATION | Facility: HOSPITAL | Age: 54
End: 2024-04-17
Payer: COMMERCIAL

## 2024-04-17 DIAGNOSIS — R29.898 DECREASED STRENGTH OF TRUNK AND BACK: Primary | ICD-10-CM

## 2024-04-17 PROCEDURE — 97750 PHYSICAL PERFORMANCE TEST: CPT | Mod: 32

## 2024-04-17 NOTE — PROGRESS NOTES
[facility]  Physical Therapy Treatment Note     Name: Lila Pineda  Clinic Number: [id number]    Therapy Diagnosis:   Encounter Diagnosis   Name Primary?    Decreased strength of trunk and back Yes     Physician: Tammie Bejarano, *    Visit Date: 4/17/2024    Physician Orders: PT Eval and Treat  Medical Diagnosis from Referral: M54.50,G89.29 (ICD-10-CM) - Chronic right-sided low back pain without sciatica   Evaluation Date: 1/4/2024  Authorization Period Expiration: 12/5/2024  Plan of Care Expiration: 5/4/2024  Reassessment Due: 5/3/24  Visit # / Visits authorized: 16/20 (employee benefit )  MedX testing visit 2     Time In:1230  Time Out:125  Total Billable Time: 50 minutes  INSURANCE and OUTCOMES: Program Benefit Group with Lumbar Outcomes (Oswestry and AQoL) 3/3     Precautions: standard     Pattern of pain determined: 1/movement responder      Subjective   Lila reports she worked 2 days back to back, and as a result has some LBP today.  R > L pain    Patient reports tolerating previous visit : No c/o  Patient reports their pain to be 2/10 on a 0-10 scale with 0 being no pain and 10 being the worst pain imaginable.  Pain Location: low back     Occupation: Nurse at [employer]   Leisure: going to park, spending time with family, would like to get back to working out , 3 adult children    Pt goals: perform job duties without pain, household chores without pain, return to working out     Objective      Lumbar  Isometric Testing on Med X equipment: Testing administered by PT    Test Initial Baseline Midpoint Final   Date 1/9/24 3/6/2024    ROM 6-42 deg 3-48 deg    Max Peak Torque 97  96    Min Peak Torque 47  62    Flex/Ext Ratio 2.1/1 1.5:1    % below normative data -46% 38%    % gain from initial test Not available visit 1 13%      MOVEMENT LOSS - Lumbar    Norms ROM Loss  Range of motion 2/7/2024 Range of motion 3/6/2024   Flexion Fingers touch toes,  sacral angle >/= 70 deg, uniform spinal curvature, posterior weight shift  within functional limits Within functional limits Within functional limits   Extension ASIS surpasses toes, spine of scapulae surpasses heels, uniform spinal curve minimal loss Within functional limits Within functional limits   Side glide Right   within functional limits Within functional limits Within functional limits   Side glide Left   within functional limits Within functional limits Within functional limits   Rotation Right PT observes contralateral shoulder minimal loss Within functional limits Within functional limits   Rotation Left PT observes contralateral shoulder minimal loss Within functional limits Within functional limits      OUTCOMES SELECTION:   Program Patient Outcome Measures     Oswestry Score:  11/50 = 22% disability   Visit 6: 6/50 + 12% disability  Visit 10: 6/50 12% disability     AQoL Score:  3/36 = 8% disability  Visit 6: 2/36 = 6% disability         Treatment     Lila received the treatments listed below:      Medical MedX Treatment as follows:  Lila received neuromuscular education  to isolate and engage spinal stabilization musculature correctly for motor control and coordination to aid in function and posture for 10 minutes on the Medical Medx Machine.  Patient performed MedX dynamic exercise with emphasis on spinal muscular control using pacer throughout  active range of motion. Therapist assisted patient in achieving optimal exertion for neural reeducation and endurance training by using the  Karon Exertion Rating scale, by instructing the patient to aim for mid range of exertion, performing 15-20 repetitions, slowly, correctly,and safely        4/17/2024    12:40 PM   [facility]   Visit Number 16   VAS Pain Rating 2   Time 5   Extension in Lying 10   Extension in Standing 10   Flexion in Lying 10   Lumbar Weight 60 lbs   Repetitions 15   Rating of Perceived Exertion 4   Ice - Z Lie (in min.) 5        Lila participated in therapeutic exercises to develop strength, endurance, ROM, flexibility, posture, and core stabilization for 40 minutes including:    LTR x 10  DKTC x 10  Piriformis stretch 2 x 20 sec  Open book 10x RTB resistance  SL clams 20x/ BTB  Bridges with 3 sec hold c/BTB x  20  Cat/cow x 10  Bird dog10x (verbal and tactile cues for level pelvis)  EIL x 10  Extension in standing  x 10  Paloff press with 10# x 15    Peripheral muscle strengthening which included one set of 15-20 repetitions at a slow and controlled 10-13 second per rep pace focused on strengthening supporting musculature in order to improve body mechanics and functional mobility. Patient and therapist focused on proper form during treatment to ensure optimal strengthening of each targeted muscle group.  Machines utilized included:Torso rotation, Leg Ext, Hip Abd, Hip Add, and Leg Press:Leg Curl, Chest Press, Rowing, Triceps, and Biceps    Lila received manual therapy techniques for 0  minutes. The following activities were included:    Pt given cold pack for 5 minutes to low back in z lie.    Patient Education and Home Exercises   Home exercises include:  LTR X 10  Open book X 10  Bridges X 10  Clamshells X 10   Extension in lying X 10   Extension in standing  X 10  Piriformis stretch  Cardio program (V5): - 1/31/24   Lifting education (V11): - 3/20/24 Dos and Don'ts of Lifting reviewed  Posture/Lumbar roll: not yet, reminded her of value and ice packs 1/9/24  Fridge Magnet Discharge handout (date given): -  Equipment at home/gym membership: [employer]    Education provided:   - cues w/ex's  - MedX performance  - Precor ex performance  - 1/31/24 Availability of Health coaching.    Written Home Exercises Provided: yes. Added piriformis stretch 3/1/24  Exercises were reviewed and Lila was able to demonstrate them prior to the end of the session.  Lila demonstrated good  understanding of the education provided.     See EMR  under Patient Instructions for exercises provided prior visit     Assessment   Lila returns without c/o pain currently. Treatment continued with mobility, strengthening, and neuro re-education exercises. Increased reps for SL clams to 20 today. Lumbar MedX weight increased to 60ft/lbs with pt completing 15 reps at 4/10 exertion level .She was able to complete the full circuit of peripheral strengthening ex's without c/o. Will continue to progress per HB protocol and patient tolerance.      Patient is making progress towards established goals.  Pt will continue to benefit from skilled outpatient physical therapy to address the deficits stated in the impairment chart, provide pt/family education and to maximize pt's level of independence in the home and community environment.     Anticipated Barriers for therapy: nil  Pt's spiritual, cultural and educational needs considered and pt agreeable to plan of care and goals as stated below:     Short term goals:  6 weeks or 10 visits   - Pt will demonstrate increased lumbar MedX ROM by at least 3 degrees from the initial ROM value with improvements noted in functional ROM and ability to perform ADLs. MET  - Pt will demonstrate increased MedX average isometric strength value by 25% from initial test resulting in improved ability to perform bending, lifting, and carrying activities safely, confidently. Appropriate and Ongoing  - Pt will report a reduction in worst pain score by 1-2 points for improved tolerance for bending activity. MET  - Pt able to perform HEP correctly with minimal cueing or supervision from therapist to encourage independent management of symptoms. MET     Long term goals: 10 weeks or 20 visits   - Pt will demonstrate increased lumbar MedX ROM by at least 6 degrees from initial ROM value, resulting in improved ability to perform functional forward bending while standing and sitting. Appropriate and Ongoing  - Pt will demonstrate increased MedX average  isometric strength value by 50% from initial test resulting in improved ability to perform bending, lifting, and carrying activities safely and confidently. Appropriate and Ongoing  - Pt to demonstrate ability to independently control and reduce their pain through posture positioning and mechanical movements throughout a typical day. Appropriate and Ongoing  - Pt will demonstrate reduced pain and improved functional outcomes as reported on the Oswestry Disability Index by reaching a score of 10% or less in order to demonstrate subjective improvement in pt's condition. . Appropriate and Ongoing  - Pt will demonstrate independence with the HEP at discharge. Appropriate and Ongoing  - Pt will be able to work out without increase in back symptoms. (patient goal) Appropriate and Ongoing     Plan   Continue with established Plan of Care towards established PT goals.     Therapist: Clotilde Lew, PT    4/17/2024

## 2024-05-02 ENCOUNTER — CLINICAL SUPPORT (OUTPATIENT)
Dept: REHABILITATION | Facility: HOSPITAL | Age: 54
End: 2024-05-02
Payer: COMMERCIAL

## 2024-05-02 DIAGNOSIS — R29.898 DECREASED STRENGTH OF TRUNK AND BACK: Primary | ICD-10-CM

## 2024-05-02 PROCEDURE — 97750 PHYSICAL PERFORMANCE TEST: CPT | Mod: 32

## 2024-05-02 NOTE — PROGRESS NOTES
[facility]  Physical Therapy Treatment Note     Name: Lila Pineda  Clinic Number: [id number]    Therapy Diagnosis:   Encounter Diagnosis   Name Primary?    Decreased strength of trunk and back Yes     Physician: Tammie Bejarano, *    Visit Date: 5/2/2024    Physician Orders: PT Eval and Treat  Medical Diagnosis from Referral: M54.50,G89.29 (ICD-10-CM) - Chronic right-sided low back pain without sciatica   Evaluation Date: 1/4/2024  Authorization Period Expiration: 12/5/2024  Plan of Care Expiration: 5/4/2024  Reassessment Due: 5/3/24  Visit # / Visits authorized: 17/20 (employee benefit )  MedX testing visit 2     Time In:12:33 PM  Time Out: 1:23 PM  Total Billable Time: 50  minutes  INSURANCE and OUTCOMES: Program Benefit Group with Lumbar Outcomes (Oswestry and AQoL) 3/3     Precautions: standard     Pattern of pain determined: 1/movement responder      Subjective   Lila reports that her back is doing well currently. States that she had to miss appt last week due to feeling ill. States that she doesn't need warm-up on bike or TM today as she just returned from walking at the mall.     Patient reports tolerating previous visit : No c/o  Patient reports their pain to be 0/10 on a 0-10 scale with 0 being no pain and 10 being the worst pain imaginable.  Pain Location: low back     Occupation: Nurse at [employer]   Leisure: going to park, spending time with family, would like to get back to working out , 3 adult children    Pt goals: perform job duties without pain, household chores without pain, return to working out     Objective      Lumbar  Isometric Testing on Med X equipment: Testing administered by PT    Test Initial Baseline Midpoint Final   Date 1/9/24 3/6/2024    ROM 6-42 deg 3-48 deg    Max Peak Torque 97  96    Min Peak Torque 47  62    Flex/Ext Ratio 2.1/1 1.5:1    % below normative data -46% 38%    % gain from initial test Not available visit 1  13%      MOVEMENT LOSS - Lumbar    Norms ROM Loss  Range of motion 2/7/2024 Range of motion 3/6/2024   Flexion Fingers touch toes, sacral angle >/= 70 deg, uniform spinal curvature, posterior weight shift  within functional limits Within functional limits Within functional limits   Extension ASIS surpasses toes, spine of scapulae surpasses heels, uniform spinal curve minimal loss Within functional limits Within functional limits   Side glide Right   within functional limits Within functional limits Within functional limits   Side glide Left   within functional limits Within functional limits Within functional limits   Rotation Right PT observes contralateral shoulder minimal loss Within functional limits Within functional limits   Rotation Left PT observes contralateral shoulder minimal loss Within functional limits Within functional limits      OUTCOMES SELECTION:   Program Patient Outcome Measures     Oswestry Score:  11/50 = 22% disability   Visit 6: 6/50 + 12% disability  Visit 10: 6/50 12% disability     AQoL Score:  3/36 = 8% disability  Visit 6: 2/36 = 6% disability         Treatment     Lila received the treatments listed below:      Medical MedX Treatment as follows:  Lila received neuromuscular education  to isolate and engage spinal stabilization musculature correctly for motor control and coordination to aid in function and posture for 10 minutes on the Medical Medx Machine.  Patient performed MedX dynamic exercise with emphasis on spinal muscular control using pacer throughout  active range of motion. Therapist assisted patient in achieving optimal exertion for neural reeducation and endurance training by using the  Karon Exertion Rating scale, by instructing the patient to aim for mid range of exertion, performing 15-20 repetitions, slowly, correctly,and safely        5/2/2024     1:07 PM   [facility] - Short   Visit Number 17   VAS Pain Rating 0   Extension in Lying 10   Extension in  Standing 10   Flexion in Lying 10   Lumbar Weight 60 lbs   Repetitions 18   Rating of Perceived Exertion 4      Lila participated in therapeutic exercises to develop strength, endurance, ROM, flexibility, posture, and core stabilization for 40 minutes including:    LTR x 10  DKTC x 10  Piriformis stretch 2 x 20 sec  Open book 10x RTB resistance  SL clams 15x/ Black Tband  Bridges with 3 sec hold c/Black Tband x  20  Cat/cow x 10  Bird dog10x (verbal and tactile cues for level pelvis)  EIL x 10  Extension in standing  x 10  Paloff press with 10# x 15    Peripheral muscle strengthening which included one set of 15-20 repetitions at a slow and controlled 10-13 second per rep pace focused on strengthening supporting musculature in order to improve body mechanics and functional mobility. Patient and therapist focused on proper form during treatment to ensure optimal strengthening of each targeted muscle group.  Machines utilized included:Torso rotation, Leg Ext, Hip Abd, Hip Add, and Leg Press:Leg Curl, Chest Press, Rowing, Triceps, and Biceps    Lila received manual therapy techniques for 0  minutes. The following activities were included:    Pt given cold pack for 5 minutes to low back in z lie.    Patient Education and Home Exercises   Home exercises include:  LTR X 10  Open book X 10  Bridges X 10  Clamshells X 10   Extension in lying X 10   Extension in standing  X 10  Piriformis stretch  Cardio program (V5): - 1/31/24   Lifting education (V11): - 3/20/24 Dos and Don'ts of Lifting reviewed  Posture/Lumbar roll: not yet, reminded her of value and ice packs 1/9/24  Fridge Magnet Discharge handout (date given): -  Equipment at home/gym membership: [employer]    Education provided:   - cues w/ex's  - MedX performance  - Precor ex performance  - 1/31/24 Availability of Health coaching.    Written Home Exercises Provided: yes. Added piriformis stretch 3/1/24  Exercises were reviewed and Lila was able to  demonstrate them prior to the end of the session.  Lila demonstrated good  understanding of the education provided.     See EMR under Patient Instructions for exercises provided prior visit     Assessment   Lila returns after a 2 week absence from therapy without c/o pain currently. Treatment continued with mobility, strengthening, and neuro re-education exercises. She was progressed to Black T-band for performance of bridging with band and BKFO ex and was able to perform ex's without c/o. Lumbar MedX resistance was maintained at 60 ft/lbs with pt completing 18 reps with a RPE = 4/10. She was able to complete the full circuit of peripheral strengthening ex's without c/o. Will continue to progress per HB protocol and patient tolerance.  She was reminded of the availability of continuing in our wellness program upon d/c.     Patient is making progress towards established goals.  Pt will continue to benefit from skilled outpatient physical therapy to address the deficits stated in the impairment chart, provide pt/family education and to maximize pt's level of independence in the home and community environment.     Anticipated Barriers for therapy: nil  Pt's spiritual, cultural and educational needs considered and pt agreeable to plan of care and goals as stated below:     Short term goals:  6 weeks or 10 visits   - Pt will demonstrate increased lumbar MedX ROM by at least 3 degrees from the initial ROM value with improvements noted in functional ROM and ability to perform ADLs. MET  - Pt will demonstrate increased MedX average isometric strength value by 25% from initial test resulting in improved ability to perform bending, lifting, and carrying activities safely, confidently. Appropriate and Ongoing  - Pt will report a reduction in worst pain score by 1-2 points for improved tolerance for bending activity. MET  - Pt able to perform HEP correctly with minimal cueing or supervision from therapist to encourage  independent management of symptoms. MET     Long term goals: 10 weeks or 20 visits   - Pt will demonstrate increased lumbar MedX ROM by at least 6 degrees from initial ROM value, resulting in improved ability to perform functional forward bending while standing and sitting. Appropriate and Ongoing  - Pt will demonstrate increased MedX average isometric strength value by 50% from initial test resulting in improved ability to perform bending, lifting, and carrying activities safely and confidently. Appropriate and Ongoing  - Pt to demonstrate ability to independently control and reduce their pain through posture positioning and mechanical movements throughout a typical day. Appropriate and Ongoing  - Pt will demonstrate reduced pain and improved functional outcomes as reported on the Oswestry Disability Index by reaching a score of 10% or less in order to demonstrate subjective improvement in pt's condition. . Appropriate and Ongoing  - Pt will demonstrate independence with the HEP at discharge. Appropriate and Ongoing  - Pt will be able to work out without increase in back symptoms. (patient goal) Appropriate and Ongoing     Plan   Continue with established Plan of Care towards established PT goals.     Therapist: Daniele Lin, PTA    5/2/2024

## 2024-05-07 ENCOUNTER — CLINICAL SUPPORT (OUTPATIENT)
Dept: REHABILITATION | Facility: HOSPITAL | Age: 54
End: 2024-05-07
Payer: COMMERCIAL

## 2024-05-07 DIAGNOSIS — R29.898 DECREASED STRENGTH OF TRUNK AND BACK: Primary | ICD-10-CM

## 2024-05-07 PROCEDURE — 97750 PHYSICAL PERFORMANCE TEST: CPT | Mod: 32

## 2024-05-07 NOTE — PATIENT INSTRUCTIONS
"HEALTHY BACK TOOLS        KEEP YOUR SPINE FEELING FINE   HEALTHY HABITS   Do your "GO TO" stretches 2/day   Get a good night's REST   Watch your POSTURE in sitting/standing Drink PLENTY of water   Use a lumbar roll Eat LOTS of fruits & vegetables   GET UP often (walk and/or stretch) Manage your STRESS   Make your workplace IDEAL FOR YOU  Don't smoke   Lift correctly EXERCISE                           WHAT TO DO WHEN SYMPTOMS FLARE UP  Back and neck pain may occasionally flare up.  If you experience a flare   up, remember your tools. Be encouraged, by remembering that flare-ups will   usually pass.   My Tools:    ~Use your "Go To" Stretches/Positions   ~Keep Moving-pain usually gets better if you move  ~Z lie (with or without ice)  10 min several times a day until symptoms reduce  ~Slowly resume normal activities   ~Practice Deep Breathing and Relaxation techniques                                                 MY EXERCISE PLAN  GO TO STRETCHES  2/day (like brushing your teeth) STRENGTHENING  2-3 times/week CARDIO PROGRAM  150 min/week   Lower trunk rotation (KNees side to side) x 10 Clamshells with band band around knees x 15 Walking or biking   Double knee to chest stretch 10 x 5 second hold Bridging with band around knees x 20    Open Book (lying on your side and moving arm) x 10 Bird dog (Hands and knees extending opposite arm and leg) x 10    Cat/Cow ( hands and knees) x 10     Back extension while lying on stomach x 10     Back extension(bending backwards) in standing x 10             "

## 2024-05-07 NOTE — PROGRESS NOTES
[facility]  Physical Therapy Treatment Note     Name: Lila Pineda  Clinic Number: [id number]    Therapy Diagnosis:   No diagnosis found.    Physician: Tammie Bejarano, *    Visit Date: 5/7/2024    Physician Orders: PT Eval and Treat  Medical Diagnosis from Referral: M54.50,G89.29 (ICD-10-CM) - Chronic right-sided low back pain without sciatica   Evaluation Date: 1/4/2024  Authorization Period Expiration: 12/5/2024  Plan of Care Expiration: 5/4/2024  Reassessment Due: 5/3/24  Visit # / Visits authorized: 17/20 (employee benefit )  MedX testing visit 2     Time In:12:33 PM  Time Out: 1:23 PM  Total Billable Time: 50  minutes  INSURANCE and OUTCOMES: Program Benefit Group with Lumbar Outcomes (Oswestry and AQoL) 3/3     Precautions: standard     Pattern of pain determined: 1/movement responder      Subjective   Lila reports that her back is doing well currently. States that she had to miss appt last week due to feeling ill. States that she doesn't need warm-up on bike or TM today as she just returned from walking at the mall.     Patient reports tolerating previous visit : No c/o  Patient reports their pain to be 0/10 on a 0-10 scale with 0 being no pain and 10 being the worst pain imaginable.  Pain Location: low back     Occupation: Nurse at [employer]   Leisure: going to park, spending time with family, would like to get back to working out , 3 adult children    Pt goals: perform job duties without pain, household chores without pain, return to working out     Objective      Lumbar  Isometric Testing on Med X equipment: Testing administered by PT    Test Initial Baseline Midpoint Final   Date 1/9/24 3/6/2024    ROM 6-42 deg 3-48 deg    Max Peak Torque 97  96    Min Peak Torque 47  62    Flex/Ext Ratio 2.1/1 1.5:1    % below normative data -46% 38%    % gain from initial test Not available visit 1 13%      MOVEMENT LOSS - Lumbar    Norms ROM Loss  Range of  motion 2/7/2024 Range of motion 3/6/2024   Flexion Fingers touch toes, sacral angle >/= 70 deg, uniform spinal curvature, posterior weight shift  within functional limits Within functional limits Within functional limits   Extension ASIS surpasses toes, spine of scapulae surpasses heels, uniform spinal curve minimal loss Within functional limits Within functional limits   Side glide Right   within functional limits Within functional limits Within functional limits   Side glide Left   within functional limits Within functional limits Within functional limits   Rotation Right PT observes contralateral shoulder minimal loss Within functional limits Within functional limits   Rotation Left PT observes contralateral shoulder minimal loss Within functional limits Within functional limits      OUTCOMES SELECTION:   Program Patient Outcome Measures     Oswestry Score:  11/50 = 22% disability   Visit 6: 6/50 + 12% disability  Visit 10: 6/50 12% disability     AQoL Score:  3/36 = 8% disability  Visit 6: 2/36 = 6% disability         Treatment     Lila received the treatments listed below:      Medical MedX Treatment as follows:  Lila received neuromuscular education  to isolate and engage spinal stabilization musculature correctly for motor control and coordination to aid in function and posture for 10 minutes on the Medical Medx Machine.  Patient performed MedX dynamic exercise with emphasis on spinal muscular control using pacer throughout  active range of motion. Therapist assisted patient in achieving optimal exertion for neural reeducation and endurance training by using the  Karon Exertion Rating scale, by instructing the patient to aim for mid range of exertion, performing 15-20 repetitions, slowly, correctly,and safely        5/2/2024     1:07 PM   [facility] - Short   Visit Number 17   VAS Pain Rating 0   Extension in Lying 10   Extension in Standing 10   Flexion in Lying 10   Lumbar Weight 60 lbs    Repetitions 18   Rating of Perceived Exertion 4      Lila participated in therapeutic exercises to develop strength, endurance, ROM, flexibility, posture, and core stabilization for 40 minutes including:    LTR x 10  DKTC x 10  Piriformis stretch 2 x 20 sec  Open book 10x RTB resistance  SL clams 15x/ Black Tband  Bridges with 3 sec hold c/Black Tband x  20  Cat/cow x 10  Bird dog10x (verbal and tactile cues for level pelvis)  EIL x 10  Extension in standing  x 10  Paloff press with 10# x 15    Peripheral muscle strengthening which included one set of 15-20 repetitions at a slow and controlled 10-13 second per rep pace focused on strengthening supporting musculature in order to improve body mechanics and functional mobility. Patient and therapist focused on proper form during treatment to ensure optimal strengthening of each targeted muscle group.  Machines utilized included:Torso rotation, Leg Ext, Hip Abd, Hip Add, and Leg Press:Leg Curl, Chest Press, Rowing, Triceps, and Biceps    Lila received manual therapy techniques for 0  minutes. The following activities were included:    Pt given cold pack for 5 minutes to low back in z lie.    Patient Education and Home Exercises   Home exercises include:  LTR X 10  Open book X 10  Bridges X 10  Clamshells X 10   Extension in lying X 10   Extension in standing  X 10  Piriformis stretch  Cardio program (V5): - 1/31/24   Lifting education (V11): - 3/20/24 Dos and Don'ts of Lifting reviewed  Posture/Lumbar roll: not yet, reminded her of value and ice packs 1/9/24  Fridge Magnet Discharge handout (date given): -  Equipment at home/gym membership: [employer]    Education provided:   - cues w/ex's  - MedX performance  - Precor ex performance  - 1/31/24 Availability of Health coaching.    Written Home Exercises Provided: yes. Added piriformis stretch 3/1/24  Exercises were reviewed and Lila was able to demonstrate them prior to the end of the session.  Lila  demonstrated good  understanding of the education provided.     See EMR under Patient Instructions for exercises provided prior visit     Assessment   Lila returns after a 2 week absence from therapy without c/o pain currently. Treatment continued with mobility, strengthening, and neuro re-education exercises. She was progressed to Black T-band for performance of bridging with band and BKFO ex and was able to perform ex's without c/o. Lumbar MedX resistance was maintained at 60 ft/lbs with pt completing 18 reps with a RPE = 4/10. She was able to complete the full circuit of peripheral strengthening ex's without c/o. Will continue to progress per HB protocol and patient tolerance.  She was reminded of the availability of continuing in our wellness program upon d/c.     Patient is making progress towards established goals.  Pt will continue to benefit from skilled outpatient physical therapy to address the deficits stated in the impairment chart, provide pt/family education and to maximize pt's level of independence in the home and community environment.     Anticipated Barriers for therapy: nil  Pt's spiritual, cultural and educational needs considered and pt agreeable to plan of care and goals as stated below:     Short term goals:  6 weeks or 10 visits   - Pt will demonstrate increased lumbar MedX ROM by at least 3 degrees from the initial ROM value with improvements noted in functional ROM and ability to perform ADLs. MET  - Pt will demonstrate increased MedX average isometric strength value by 25% from initial test resulting in improved ability to perform bending, lifting, and carrying activities safely, confidently. Appropriate and Ongoing  - Pt will report a reduction in worst pain score by 1-2 points for improved tolerance for bending activity. MET  - Pt able to perform HEP correctly with minimal cueing or supervision from therapist to encourage independent management of symptoms. MET     Long term goals: 10  weeks or 20 visits   - Pt will demonstrate increased lumbar MedX ROM by at least 6 degrees from initial ROM value, resulting in improved ability to perform functional forward bending while standing and sitting. Appropriate and Ongoing  - Pt will demonstrate increased MedX average isometric strength value by 50% from initial test resulting in improved ability to perform bending, lifting, and carrying activities safely and confidently. Appropriate and Ongoing  - Pt to demonstrate ability to independently control and reduce their pain through posture positioning and mechanical movements throughout a typical day. Appropriate and Ongoing  - Pt will demonstrate reduced pain and improved functional outcomes as reported on the Oswestry Disability Index by reaching a score of 10% or less in order to demonstrate subjective improvement in pt's condition. . Appropriate and Ongoing  - Pt will demonstrate independence with the HEP at discharge. Appropriate and Ongoing  - Pt will be able to work out without increase in back symptoms. (patient goal) Appropriate and Ongoing     Plan   Continue with established Plan of Care towards established PT goals.     Therapist: Kelly Phoenix, PT    5/7/2024

## 2024-05-07 NOTE — PROGRESS NOTES
[facility]  Physical Therapy Treatment Note     Name: Lila Pineda  Clinic Number: [id number]    Therapy Diagnosis:   Encounter Diagnosis   Name Primary?    Decreased strength of trunk and back Yes     Physician: Tammie Bejarano, *    Visit Date: 5/7/2024    Physician Orders: PT Eval and Treat  Medical Diagnosis from Referral: M54.50,G89.29 (ICD-10-CM) - Chronic right-sided low back pain without sciatica   Evaluation Date: 1/4/2024  Authorization Period Expiration: 12/5/2024  Plan of Care Expiration: 5/4/2024  Reassessment Due: 5/3/24 ( a reassessment was performed by Kelly Herman, PT this visit 5/7/24)  Visit # / Visits authorized: 18/20 (employee benefit )  MedX testing visit 2     Time In: 11:00  AM   Time Out:  11:55 AM   Total Billable Time: 50 minutes  INSURANCE and OUTCOMES: Program Benefit Group with Lumbar Outcomes (Oswestry and AQoL) 3/3     Precautions: standard     Pattern of pain determined: 1/movement responder      Subjective   Lila reports that her back is doing well currently.  Minimal stiffness without c/o pain.     Patient reports tolerating previous visit : No c/o  Patient reports their pain to be 0/10 on a 0-10 scale with 0 being no pain and 10 being the worst pain imaginable.  Pain Location: low back     Occupation: Nurse at [employer]   Leisure: going to park, spending time with family, would like to get back to working out , 3 adult children    Pt goals: perform job duties without pain, household chores without pain, return to working out     Objective      Lumbar  Isometric Testing on Med X equipment: Testing administered by PT    Test Initial Baseline Midpoint Final   Date 1/9/24 3/6/2024    ROM 6-42 deg 3-48 deg    Max Peak Torque 97  96    Min Peak Torque 47  62    Flex/Ext Ratio 2.1/1 1.5:1    % below normative data -46% 38%    % gain from initial test Not available visit 1 13%      MOVEMENT LOSS - Lumbar    Norms ROM Loss   Range of motion 2/7/2024 Range of motion 3/6/2024 ROM 5/7/24   Flexion Fingers touch toes, sacral angle >/= 70 deg, uniform spinal curvature, posterior weight shift  within functional limits Within functional limits Within functional limits WFL   Extension ASIS surpasses toes, spine of scapulae surpasses heels, uniform spinal curve minimal loss Within functional limits Within functional limits WFL   Side glide Right   within functional limits Within functional limits Within functional limits WFL   Side glide Left   within functional limits Within functional limits Within functional limits WFL   Rotation Right PT observes contralateral shoulder minimal loss Within functional limits Within functional limits WFL   Rotation Left PT observes contralateral shoulder minimal loss Within functional limits Within functional limits WFL      OUTCOMES SELECTION:   Program Patient Outcome Measures     Oswestry Score:  11/50 = 22% disability   Visit 6: 6/50 + 12% disability  Visit 10: 6/50 12% disability     AQoL Score:  3/36 = 8% disability  Visit 6: 2/36 = 6% disability         Treatment     Lila received the treatments listed below:      Medical MedX Treatment as follows:  Lila received neuromuscular education  to isolate and engage spinal stabilization musculature correctly for motor control and coordination to aid in function and posture for 10 minutes on the Medical Medx Machine.  Patient performed MedX dynamic exercise with emphasis on spinal muscular control using pacer throughout  active range of motion. Therapist assisted patient in achieving optimal exertion for neural reeducation and endurance training by using the  Karon Exertion Rating scale, by instructing the patient to aim for mid range of exertion, performing 15-20 repetitions, slowly, correctly,and safely        5/7/2024    11:36 AM   [facility] - Short   Visit Number 18   VAS Pain Rating 0   Time 5   Extension in Lying 10   Extension in Standing 10  "  Flexion in Lying 10   Lumbar Weight 60 lbs   Repetitions 20   Rating of Perceived Exertion 4.5       Lila participated in therapeutic exercises to develop strength, endurance, ROM, flexibility, posture, and core stabilization for 40 minutes including:    "Go-To Fridge Magnet ex's"  LTR x 10  DKTC x 10  Piriformis stretch 2 x 20 sec  Open book 10x RTB resistance  SL clams 15x/ Black Tband  Bridges with 3 sec hold c/Black Tband x  20  Cat/cow x 10  Bird dog10x (verbal and tactile cues for level pelvis)  EIL x 10  Extension in standing  x 10    Paloff press with 10# x 15 NP    Peripheral muscle strengthening which included one set of 15-20 repetitions at a slow and controlled 10-13 second per rep pace focused on strengthening supporting musculature in order to improve body mechanics and functional mobility. Patient and therapist focused on proper form during treatment to ensure optimal strengthening of each targeted muscle group.  Machines utilized included:Torso rotation, Leg Ext, Hip Abd, Hip Add, and Leg Press:Leg Curl, Chest Press, Rowing, Triceps, and Biceps    Lila received manual therapy techniques for 0  minutes. The following activities were included:    Pt given cold pack for 5 minutes to low back in z lie.    Patient Education and Home Exercises   Home exercises include:     Cardio program (V5): - 1/31/24   Lifting education (V11): - 3/20/24 Dos and Don'ts of Lifting reviewed  Posture/Lumbar roll: not yet, reminded her of value and ice packs 1/9/24  Fridge Magnet Discharge handout (date given): - 5/7/24  Equipment at home/gym membership: [employer]    Education provided:   - cues w/ex's  - MedX performance  - Precor ex performance  - 1/31/24 Availability of Health coaching.    Written Home Exercises Provided: yes. Educated in her "Go-To Fridge Magnet ex's"  Exercises were reviewed and Lila was able to demonstrate them prior to the end of the session.  Lila demonstrated good  understanding of the " "education provided.     See EMR under Patient Instructions for exercises provided prior visit and today's visit.    Assessment   Lila returns with mild stiffness/fatigue without c/o pain currently. Treatment continued with mobility, strengthening, and neuro re-education exercises. She was educated in and performed her "Go-To Fridge Magnet ex's" with good understanding and without c/o.  Lumbar MedX resistance was maintained at 60 ft/lbs with pt completing 20 reps with a RPE = 4.5/10. She was able to complete the full circuit of peripheral strengthening ex's without c/o. Will continue to progress per HB protocol and patient tolerance.      Patient is making progress towards established goals.  Pt will continue to benefit from skilled outpatient physical therapy to address the deficits stated in the impairment chart, provide pt/family education and to maximize pt's level of independence in the home and community environment.     Anticipated Barriers for therapy: nil  Pt's spiritual, cultural and educational needs considered and pt agreeable to plan of care and goals as stated below:     Short term goals:  6 weeks or 10 visits   - Pt will demonstrate increased lumbar MedX ROM by at least 3 degrees from the initial ROM value with improvements noted in functional ROM and ability to perform ADLs. MET  - Pt will demonstrate increased MedX average isometric strength value by 25% from initial test resulting in improved ability to perform bending, lifting, and carrying activities safely, confidently. Appropriate and Ongoing  - Pt will report a reduction in worst pain score by 1-2 points for improved tolerance for bending activity. MET  - Pt able to perform HEP correctly with minimal cueing or supervision from therapist to encourage independent management of symptoms. MET     Long term goals: 10 weeks or 20 visits   - Pt will demonstrate increased lumbar MedX ROM by at least 6 degrees from initial ROM value, resulting in " improved ability to perform functional forward bending while standing and sitting. Appropriate and Ongoing  - Pt will demonstrate increased MedX average isometric strength value by 50% from initial test resulting in improved ability to perform bending, lifting, and carrying activities safely and confidently. Appropriate and Ongoing  - Pt to demonstrate ability to independently control and reduce their pain through posture positioning and mechanical movements throughout a typical day. Appropriate and Ongoing  - Pt will demonstrate reduced pain and improved functional outcomes as reported on the Oswestry Disability Index by reaching a score of 10% or less in order to demonstrate subjective improvement in pt's condition. . Appropriate and Ongoing  - Pt will demonstrate independence with the HEP at discharge. Appropriate and Ongoing  - Pt will be able to work out without increase in back symptoms. (patient goal) Appropriate and Ongoing     Plan   Continue with established Plan of Care towards established PT goals.     Therapist: Daniele Lin, XOCHITL    5/7/2024

## 2024-05-22 ENCOUNTER — CLINICAL SUPPORT (OUTPATIENT)
Dept: REHABILITATION | Facility: HOSPITAL | Age: 54
End: 2024-05-22
Payer: COMMERCIAL

## 2024-05-22 ENCOUNTER — OFFICE VISIT (OUTPATIENT)
Dept: OBSTETRICS AND GYNECOLOGY | Facility: CLINIC | Age: 54
End: 2024-05-22
Payer: COMMERCIAL

## 2024-05-22 VITALS
WEIGHT: 152.44 LBS | SYSTOLIC BLOOD PRESSURE: 161 MMHG | BODY MASS INDEX: 27.88 KG/M2 | DIASTOLIC BLOOD PRESSURE: 91 MMHG

## 2024-05-22 DIAGNOSIS — R29.898 DECREASED STRENGTH OF TRUNK AND BACK: Primary | ICD-10-CM

## 2024-05-22 DIAGNOSIS — N90.89 VULVAR MASS: ICD-10-CM

## 2024-05-22 DIAGNOSIS — L73.9 FOLLICULITIS: Primary | ICD-10-CM

## 2024-05-22 PROCEDURE — 97750 PHYSICAL PERFORMANCE TEST: CPT | Mod: 32

## 2024-05-22 PROCEDURE — 3077F SYST BP >= 140 MM HG: CPT | Mod: CPTII,S$GLB,, | Performed by: STUDENT IN AN ORGANIZED HEALTH CARE EDUCATION/TRAINING PROGRAM

## 2024-05-22 PROCEDURE — 3008F BODY MASS INDEX DOCD: CPT | Mod: CPTII,S$GLB,, | Performed by: STUDENT IN AN ORGANIZED HEALTH CARE EDUCATION/TRAINING PROGRAM

## 2024-05-22 PROCEDURE — 1159F MED LIST DOCD IN RCRD: CPT | Mod: CPTII,S$GLB,, | Performed by: STUDENT IN AN ORGANIZED HEALTH CARE EDUCATION/TRAINING PROGRAM

## 2024-05-22 PROCEDURE — 56405 I&D VULVA/PERINEAL ABSCESS: CPT | Mod: S$GLB,,, | Performed by: STUDENT IN AN ORGANIZED HEALTH CARE EDUCATION/TRAINING PROGRAM

## 2024-05-22 PROCEDURE — 99213 OFFICE O/P EST LOW 20 MIN: CPT | Mod: 25,S$GLB,, | Performed by: STUDENT IN AN ORGANIZED HEALTH CARE EDUCATION/TRAINING PROGRAM

## 2024-05-22 PROCEDURE — 99999 PR PBB SHADOW E&M-EST. PATIENT-LVL III: CPT | Mod: PBBFAC,,, | Performed by: STUDENT IN AN ORGANIZED HEALTH CARE EDUCATION/TRAINING PROGRAM

## 2024-05-22 PROCEDURE — 3080F DIAST BP >= 90 MM HG: CPT | Mod: CPTII,S$GLB,, | Performed by: STUDENT IN AN ORGANIZED HEALTH CARE EDUCATION/TRAINING PROGRAM

## 2024-05-22 RX ORDER — SULFAMETHOXAZOLE AND TRIMETHOPRIM 800; 160 MG/1; MG/1
1 TABLET ORAL 2 TIMES DAILY
Qty: 14 TABLET | Refills: 0 | Status: SHIPPED | OUTPATIENT
Start: 2024-05-22 | End: 2024-05-29

## 2024-05-22 NOTE — PROGRESS NOTES
CC: Groin Swelling    HPI:  Lila Pineda is a 53 y.o. female  presents with complaint of right labia mass. Noticed first two months ago, went away and then just came back again. Feels hard, mild TTP. No drainage or bleeding from area. No fevers.    ROS:  GENERAL: No fever, chills, fatigability or weight loss.  VULVAR: see HPI  VAGINAL: No relaxation, no itching, no discharge, no abnormal bleeding and no lesions.  ABDOMEN: No abdominal pain. Denies nausea. Denies vomiting. No diarrhea. No constipation  BREAST: Denies pain. No lumps. No discharge.  URINARY: No incontinence, no nocturia, no frequency and no dysuria.  CARDIOVASCULAR: No chest pain. No shortness of breath. No leg cramps.  NEUROLOGICAL: No headaches. No vision changes.      Patient History:  Past Medical History:   Diagnosis Date    Perimenopausal     Shingles      Past Surgical History:   Procedure Laterality Date    breast reduction  Right     right breast only-    BREAST SURGERY Bilateral     mastoplexy-    COLONOSCOPY N/A 2021    Procedure: COLONOSCOPY SUPREP RAPID TEST;  Surgeon: Delbert Encinas MD;  Location: [facility];  Service: Endoscopy;  Laterality: N/A;     Social History     Tobacco Use    Smoking status: Never    Smokeless tobacco: Never   Substance Use Topics    Alcohol use: Yes     Comment: rare - holidays only    Drug use: Never     Family History   Problem Relation Name Age of Onset    Liver disease Mother      Hashimoto's thyroiditis Mother      Breast cancer Sister  49    Epilepsy Sister      Immunodeficiency Daughter          CVID     OB History    Para Term  AB Living   3 3 3     3   SAB IAB Ectopic Multiple Live Births           3      # Outcome Date GA Lbr Simón/2nd Weight Sex Type Anes PTL Lv   3 Term     F    GAGAN   2 Term     F    GAGAN   1 Term     M    GAGAN       Objective:   BP (!) 161/91   Wt 69.2 kg (152 lb 7.2 oz)   LMP 2021   BMI 27.88 kg/m²   Patient's last menstrual  period was 11/20/2021.      PHYSICAL EXAM:  APPEARANCE: Well nourished, well developed, in no acute distress.  AFFECT: WNL, alert and oriented x 3  SKIN: No acne or hirsutism  NECK: Neck symmetric without masses or thyromegaly  CHEST: Good respiratory effect  PELVIC: Normal external genitalia, right mons folliculitis/cyst 5mm size round and mild tenderness.   EXTREMITIES: No edema.      ASSESSMENT and PLAN:    ICD-10-CM ICD-9-CM    1. Folliculitis  L73.9 704.8 sulfamethoxazole-trimethoprim 800-160mg (BACTRIM DS) 800-160 mg Tab      2. Vulvar mass  N90.89 625.8 sulfamethoxazole-trimethoprim 800-160mg (BACTRIM DS) 800-160 mg Tab          Vulvar mass/folliculitis/small cyst  - discussed benign nature of exam findings, offered I&D due to discomfort   - consent forms reviewed and signed, I&D with small amount of pus noted  - abx sent to pharmacy   - discussed cyst wall may still be present after drainage and can refill, if more painful or larger in size and drain again in clinic or observe       Follow up: as needed if symptoms worsen        Tess Reese MD  OBGYN [facility]

## 2024-05-22 NOTE — PROGRESS NOTES
[facility]  Physical Therapy Treatment Note     Name: Lila Pineda  Clinic Number: [id number]    Therapy Diagnosis:   Encounter Diagnosis   Name Primary?    Decreased strength of trunk and back Yes     Physician: Tammie Bejarano, *    Visit Date: 5/22/2024    Physician Orders: PT Eval and Treat  Medical Diagnosis from Referral: M54.50,G89.29 (ICD-10-CM) - Chronic right-sided low back pain without sciatica   Evaluation Date: 1/4/2024  Authorization Period Expiration: 12/5/2024  Plan of Care Expiration: 5/4/2024 (updated POC sent by Clotilde Lew PT)  Reassessment Due: 6/7/24   Visit # / Visits authorized: 19/20 (employee benefit )  MedX testing visit 2     Time In: 12:32 PM   Time Out:  12:22 PM  Total Billable Time:  50 minutes  INSURANCE and OUTCOMES: Program Benefit Group with Lumbar Outcomes (Oswestry and AQoL) 3/3     Precautions: standard     Pattern of pain determined: 1/movement responder      Subjective   Lila reports that her back is doing well currently. States that her back held up well recently with travel to and from [place] for her Daughter's graduation.    Patient reports tolerating previous visit : No c/o  Patient reports their pain to be 0/10 on a 0-10 scale with 0 being no pain and 10 being the worst pain imaginable.  Pain Location: low back     Occupation: Nurse at [employer]   Leisure: going to park, spending time with family, would like to get back to working out , 3 adult children    Pt goals: perform job duties without pain, household chores without pain, return to working out     Objective      Lumbar  Isometric Testing on Med X equipment: Testing administered by PT    Test Initial Baseline Midpoint Final   Date 1/9/24 3/6/2024    ROM 6-42 deg 3-48 deg    Max Peak Torque 97  96    Min Peak Torque 47  62    Flex/Ext Ratio 2.1/1 1.5:1    % below normative data -46% 38%    % gain from initial test Not available visit 1 13%       MOVEMENT LOSS - Lumbar    Norms ROM Loss  Range of motion 2/7/2024 Range of motion 3/6/2024 ROM 5/7/24   Flexion Fingers touch toes, sacral angle >/= 70 deg, uniform spinal curvature, posterior weight shift  within functional limits Within functional limits Within functional limits WFL   Extension ASIS surpasses toes, spine of scapulae surpasses heels, uniform spinal curve minimal loss Within functional limits Within functional limits WFL   Side glide Right   within functional limits Within functional limits Within functional limits WFL   Side glide Left   within functional limits Within functional limits Within functional limits WFL   Rotation Right PT observes contralateral shoulder minimal loss Within functional limits Within functional limits WFL   Rotation Left PT observes contralateral shoulder minimal loss Within functional limits Within functional limits WFL      OUTCOMES SELECTION:   Program Patient Outcome Measures     Oswestry Score:  11/50 = 22% disability   Visit 6: 6/50 + 12% disability  Visit 10: 6/50 12% disability     AQoL Score:  3/36 = 8% disability  Visit 6: 2/36 = 6% disability         Treatment     Lila received the treatments listed below:      Medical MedX Treatment as follows:  Lila received neuromuscular education  to isolate and engage spinal stabilization musculature correctly for motor control and coordination to aid in function and posture for 10 minutes on the Medical Medx Machine.  Patient performed MedX dynamic exercise with emphasis on spinal muscular control using pacer throughout  active range of motion. Therapist assisted patient in achieving optimal exertion for neural reeducation and endurance training by using the  Karon Exertion Rating scale, by instructing the patient to aim for mid range of exertion, performing 15-20 repetitions, slowly, correctly,and safely        5/22/2024    12:45 PM   [facility] - Short   Visit Number 19   VAS Pain Rating 0   Treadmill  "Time (in min.) 5 min   Extension in Lying 10   Extension in Standing 10   Flexion in Lying 10   Lumbar Weight 60 lbs   Repetitions 20   Rating of Perceived Exertion 4        Lila participated in therapeutic exercises to develop strength, endurance, ROM, flexibility, posture, and core stabilization for 40 minutes including:    LTR x 10  DKTC x 10  Piriformis stretch 2 x 20 sec  Open book 10x RTB resistance  SL clams 15x/ Black Tband  Bridges with 3 sec hold c/Black Tband x  20  Cat/cow x 10  Bird dog10x (verbal and tactile cues for level pelvis)  EIL x 10  Extension in standing x 10  Paloff press with 10# x 15    Peripheral muscle strengthening which included one set of 15-20 repetitions at a slow and controlled 10-13 second per rep pace focused on strengthening supporting musculature in order to improve body mechanics and functional mobility. Patient and therapist focused on proper form during treatment to ensure optimal strengthening of each targeted muscle group.  Machines utilized included:Torso rotation, Leg Ext, Hip Abd, Hip Add, and Leg Press:Leg Curl, Chest Press, Rowing, Triceps, and Biceps    Lila received manual therapy techniques for 0  minutes. The following activities were included:    Pt given cold pack for 5 minutes to low back in z lie.    Patient Education and Home Exercises   Home exercises include:     Cardio program (V5): - 1/31/24   Lifting education (V11): - 3/20/24 Dos and Don'ts of Lifting reviewed  Posture/Lumbar roll: not yet, reminded her of value and ice packs 1/9/24  Fridge Magnet Discharge handout (date given): - 5/7/24  Equipment at home/gym membership: [employer]    Education provided:   - cues w/ex's  - MedX performance  - Precor ex performance  - 1/31/24 Availability of Health coaching.    Written Home Exercises Provided: yes. Educated in her "Go-To Fridge Magnet ex's"  Exercises were reviewed and Lila was able to demonstrate them prior to the end of the session.  Lila " demonstrated good  understanding of the education provided.     See EMR under Patient Instructions for exercises provided prior visit     Assessment   Lila returns with mild stiffness/fatigue without c/o pain currently. Treatment continued with mobility, strengthening, and neuro re-education exercises. Resumed performance of Paloff press for additional core strengthening without c/o. Due to increased time between visits ,Lumbar MedX resistance was not increased and was maintained at 60 ft/lbs with pt completing 20 reps with a RPE =  4/10. She was able to complete the full circuit of peripheral strengthening ex's without c/o. Will continue to progress per HB protocol and patient tolerance.      Patient is making progress towards established goals.  Pt will continue to benefit from skilled outpatient physical therapy to address the deficits stated in the impairment chart, provide pt/family education and to maximize pt's level of independence in the home and community environment.     Anticipated Barriers for therapy: nil  Pt's spiritual, cultural and educational needs considered and pt agreeable to plan of care and goals as stated below:     Short term goals:  6 weeks or 10 visits   - Pt will demonstrate increased lumbar MedX ROM by at least 3 degrees from the initial ROM value with improvements noted in functional ROM and ability to perform ADLs. MET  - Pt will demonstrate increased MedX average isometric strength value by 25% from initial test resulting in improved ability to perform bending, lifting, and carrying activities safely, confidently. Appropriate and Ongoing  - Pt will report a reduction in worst pain score by 1-2 points for improved tolerance for bending activity. MET  - Pt able to perform HEP correctly with minimal cueing or supervision from therapist to encourage independent management of symptoms. MET     Long term goals: 10 weeks or 20 visits   - Pt will demonstrate increased lumbar MedX ROM by at  least 6 degrees from initial ROM value, resulting in improved ability to perform functional forward bending while standing and sitting. Appropriate and Ongoing  - Pt will demonstrate increased MedX average isometric strength value by 50% from initial test resulting in improved ability to perform bending, lifting, and carrying activities safely and confidently. Appropriate and Ongoing  - Pt to demonstrate ability to independently control and reduce their pain through posture positioning and mechanical movements throughout a typical day. Appropriate and Ongoing  - Pt will demonstrate reduced pain and improved functional outcomes as reported on the Oswestry Disability Index by reaching a score of 10% or less in order to demonstrate subjective improvement in pt's condition. . Appropriate and Ongoing  - Pt will demonstrate independence with the HEP at discharge. Appropriate and Ongoing  - Pt will be able to work out without increase in back symptoms. (patient goal) Appropriate and Ongoing     Plan   Continue with established Plan of Care towards established PT goals.     Therapist: Daniele Lin, PTA    5/22/2024

## 2024-05-22 NOTE — PROCEDURES
INCISION AND DRAINAGE    Date/Time: 5/22/2024 4:00 PM    Performed by: Tess Reese MD  Authorized by: Tess Reese MD    Consent Done?:  Yes (Written)    Type:  Abscess  Body area:  Anogenital  Location details:  Vulva  Local anesthetic: Lidocaine 1% with epinephrine  Anesthetic total (ml):  5  Scalpel size:  11  Incision type:  Single straight  Incision depth: dermal    Complexity:  Simple  Drainage:  Pus  Drainage amount:  Scant  Wound treatment:  Drainage and expression of material  Packing material:  None  Patient tolerance:  Patient tolerated the procedure well with no immediate complications

## 2024-05-22 NOTE — PLAN OF CARE
Outpatient Therapy Updated Plan of Care     Visit Date: 5/22/2024    Name: Lila Pineda  Clinic Number: [id number]    Therapy Diagnosis:   Encounter Diagnosis   Name Primary?    Decreased strength of trunk and back Yes     Physician: Tammie Bejarano, *     Physician Orders: PT Eval and Treat  Medical Diagnosis from Referral: M54.50,G89.29 (ICD-10-CM) - Chronic right-sided low back pain without sciatica   Evaluation Date: 1/4/2024  Authorization Period Expiration: 12/5/2024  Plan of Care Expiration: 5/4/2024-6/4/24 (updated POC sent by Clotilde Lew PT)  Reassessment Due: 6/7/24   Visit # / Visits authorized: 19/20 (employee benefit )  MedX testing visit 2     Time In: 12:32 PM   Time Out:  12:22 PM  Total Billable Time:  50 minutes  INSURANCE and OUTCOMES: Program Benefit Group with Lumbar Outcomes (Oswestry and AQoL) 3/3     Precautions: standard     Pattern of pain determined: 1/movement responder       Subjective     Update: Lila reports that her back is doing well currently. States that her back held up well recently with travel to and from [place] for her Daughter's graduation.     Patient reports tolerating previous visit : No c/o  Patient reports their pain to be 0/10 on a 0-10 scale with 0 being no pain and 10 being the worst pain imaginable.  Pain Location: low back     Occupation: Nurse at [employer]   Leisure: going to park, spending time with family, would like to get back to working out , 3 adult children    Pt goals: perform job duties without pain, household chores without pain, return to working out        Objective     Update: Lumbar  Isometric Testing on Med X equipment: Testing administered by PT     Test Initial Baseline Midpoint Final   Date 1/9/24 3/6/2024     ROM 6-42 deg 3-48 deg     Max Peak Torque 97  96     Min Peak Torque 47  62     Flex/Ext Ratio 2.1/1 1.5:1     % below normative data -46% 38%     % gain from initial test Not available visit 1 13%       MOVEMENT LOSS -  Lumbar    Norms ROM Loss  Range of motion 2/7/2024 Range of motion 3/6/2024 ROM 5/7/24   Flexion Fingers touch toes, sacral angle >/= 70 deg, uniform spinal curvature, posterior weight shift  within functional limits Within functional limits Within functional limits WFL   Extension ASIS surpasses toes, spine of scapulae surpasses heels, uniform spinal curve minimal loss Within functional limits Within functional limits WFL   Side glide Right   within functional limits Within functional limits Within functional limits WFL   Side glide Left   within functional limits Within functional limits Within functional limits WFL   Rotation Right PT observes contralateral shoulder minimal loss Within functional limits Within functional limits WFL   Rotation Left PT observes contralateral shoulder minimal loss Within functional limits Within functional limits WFL      OUTCOMES SELECTION:   Program Patient Outcome Measures     Oswestry Score:  11/50 = 22% disability   Visit 6: 6/50 + 12% disability  Visit 10: 6/50 12% disability     AQoL Score:  3/36 = 8% disability  Visit 6: 2/36 = 6% disability           Assessment     Update: Pt is making good progress with skilled intervention.  Pt increased 13% in strength as per Med X testing, and has decreased TAB score from 22% to 12% indicating increased functional ability.  Cervical ROM is now WNL's and pt reports ability to perform ADL's with decreased pain.      Short term goals:  6 weeks or 10 visits   - Pt will demonstrate increased lumbar MedX ROM by at least 3 degrees from the initial ROM value with improvements noted in functional ROM and ability to perform ADLs. MET  - Pt will demonstrate increased MedX average isometric strength value by 25% from initial test resulting in improved ability to perform bending, lifting, and carrying activities safely, confidently. Appropriate and Ongoing  - Pt will report a reduction in worst pain score by 1-2 points for improved tolerance for  "bending activity. MET  - Pt able to perform HEP correctly with minimal cueing or supervision from therapist to encourage independent management of symptoms. MET     Long term goals: 10 weeks or 20 visits   - Pt will demonstrate increased lumbar MedX ROM by at least 6 degrees from initial ROM value, resulting in improved ability to perform functional forward bending while standing and sitting. Appropriate and Ongoing  - Pt will demonstrate increased MedX average isometric strength value by 50% from initial test resulting in improved ability to perform bending, lifting, and carrying activities safely and confidently. Appropriate and Ongoing  - Pt to demonstrate ability to independently control and reduce their pain through posture positioning and mechanical movements throughout a typical day. Appropriate and Ongoing  - Pt will demonstrate reduced pain and improved functional outcomes as reported on the Oswestry Disability Index by reaching a score of 10% or less in order to demonstrate subjective improvement in pt's condition. . Appropriate and Ongoing  - Pt will demonstrate independence with the HEP at discharge. Appropriate and Ongoing  - Pt will be able to work out without increase in back symptoms. (patient goal) Appropriate and Ongoing   Plan     Updated Certification Period:5/4/24 to 6/4/24  Recommended Treatment Plan: 1-2 times per week for 4weeks: Manual Therapy, Moist Heat/ Ice, Neuromuscular Re-ed, Patient Education, Self Care, Therapeutic Activities, and Therapeutic Exercise  Other Recommendations: n/a    Outpatient physical therapy 2x week for 13 weeks or 20 visits to include the following:   - Patient education  - Therapeutic exercise  - Manual therapy  - Performance testing   - Neuromuscular Re-education  - Therapeutic activity   - Modalities    Pt may be seen by PTA as part of the rehabilitation team.     Therapist: Clotilde Lew, PT  5/22/2024    "I certify the need for these services furnished under " "this plan of treatment and while under my care."    ____________________________________  Physician/Referring Practitioner    _______________  Date of Signature    Clotilde Lew, PT  5/22/2024      I CERTIFY THE NEED FOR THESE SERVICES FURNISHED UNDER THIS PLAN OF TREATMENT AND WHILE UNDER MY CARE    Physician's comments:        Physician's Signature: ___________________________________________________     "

## 2024-05-23 ENCOUNTER — PATIENT MESSAGE (OUTPATIENT)
Dept: OBSTETRICS AND GYNECOLOGY | Facility: CLINIC | Age: 54
End: 2024-05-23
Payer: COMMERCIAL

## 2024-05-23 DIAGNOSIS — N90.89 VULVAR MASS: Primary | ICD-10-CM

## 2024-05-23 RX ORDER — AMOXICILLIN AND CLAVULANATE POTASSIUM 875; 125 MG/1; MG/1
1 TABLET, FILM COATED ORAL EVERY 12 HOURS
Qty: 14 TABLET | Refills: 0 | Status: SHIPPED | OUTPATIENT
Start: 2024-05-23 | End: 2024-05-30

## 2024-05-30 ENCOUNTER — CLINICAL SUPPORT (OUTPATIENT)
Dept: REHABILITATION | Facility: HOSPITAL | Age: 54
End: 2024-05-30
Payer: COMMERCIAL

## 2024-05-30 DIAGNOSIS — R29.898 DECREASED STRENGTH OF TRUNK AND BACK: Primary | ICD-10-CM

## 2024-05-30 PROCEDURE — 97750 PHYSICAL PERFORMANCE TEST: CPT | Mod: 32

## 2024-05-30 NOTE — PROGRESS NOTES
[facility]  Physical Therapy Treatment Note     Name: Lila Pineda  Clinic Number: [id number]    Therapy Diagnosis:   Encounter Diagnosis   Name Primary?    Decreased strength of trunk and back Yes     Physician: Tammie Bejarano, *    Visit Date: 5/30/2024    Physician Orders: PT Eval and Treat  Medical Diagnosis from Referral: M54.50,G89.29 (ICD-10-CM) - Chronic right-sided low back pain without sciatica   Evaluation Date: 1/4/2024  Authorization Period Expiration: 12/5/2024  Plan of Care Expiration: 5/4/2024 (updated POC sent by Clotilde Lew PT)  Reassessment Due: 6/7/24   Visit # / Visits authorized: 20/20 (employee benefit )  MedX testing visit 2     Time In: 12:35 PM   Time Out: 1:30 PM  Total Billable Time:  50 minutes  INSURANCE and OUTCOMES: Program Benefit Group with Lumbar Outcomes (Oswestry and AQoL) 3/3     Precautions: standard     Pattern of pain determined: 1/movement responder      Subjective   Lila reports that her back is doing well currently. She states the HB program has been very helpful and she is able to get through her work shifts without back pain.     Patient reports tolerating previous visit : No c/o  Patient reports their pain to be 0/10 on a 0-10 scale with 0 being no pain and 10 being the worst pain imaginable.  Pain Location: low back     Occupation: Nurse at [employer]   Leisure: going to park, spending time with family, would like to get back to working out , 3 adult children    Pt goals: perform job duties without pain, household chores without pain, return to working out     Objective      Lumbar  Isometric Testing on Med X equipment: Testing administered by PT    Test Initial Baseline Midpoint Final   Date 1/9/24 3/6/2024 5/30/24   ROM 6-42 deg 3-48 deg 0-51 deg   Max Peak Torque 97  96 125   Min Peak Torque 47  62 76   Flex/Ext Ratio 2.1/1 1.5:1 1.6:1   % below normative data -46% 38% 22%   % gain from initial test  Not available visit 1 13% 38%     MOVEMENT LOSS - Lumbar    Norms ROM Loss  Range of motion 2/7/2024 Range of motion 3/6/2024 ROM 5/7/24   Flexion Fingers touch toes, sacral angle >/= 70 deg, uniform spinal curvature, posterior weight shift  within functional limits Within functional limits Within functional limits WFL   Extension ASIS surpasses toes, spine of scapulae surpasses heels, uniform spinal curve minimal loss Within functional limits Within functional limits WFL   Side glide Right   within functional limits Within functional limits Within functional limits WFL   Side glide Left   within functional limits Within functional limits Within functional limits WFL   Rotation Right PT observes contralateral shoulder minimal loss Within functional limits Within functional limits WFL   Rotation Left PT observes contralateral shoulder minimal loss Within functional limits Within functional limits WFL      OUTCOMES SELECTION:   Program Patient Outcome Measures     Oswestry Score:  11/50 = 22% disability   Visit 6: 6/50 + 12% disability  Visit 10: 6/50 12% disability  Discharge: 0% disability      AQoL Score:  3/36 = 8% disability  Visit 6: 2/36 = 6% disability         Treatment     Lila received the treatments listed below:      Medical MedX Treatment as follows:  Lila received neuromuscular education  to isolate and engage spinal stabilization musculature correctly for motor control and coordination to aid in function and posture for 10 minutes on the Medical Medx Machine.  Patient performed MedX dynamic exercise with emphasis on spinal muscular control using pacer throughout  active range of motion. Therapist assisted patient in achieving optimal exertion for neural reeducation and endurance training by using the  Karon Exertion Rating scale, by instructing the patient to aim for mid range of exertion, performing 15-20 repetitions, slowly, correctly,and safely        5/30/2024    12:48 PM   [facility]    Visit Number 20   VAS Pain Rating 0   Time 5   Extension in Lying 10   Extension in Standing 10   Flexion in Lying 10   Lumbar Flexion 51   Lumbar Extension 0   Lumbar Peak Torque 125 ft. lbs.   Min Torque 76   Test Percent Below Normative Data 22 %   Test Percent Gain in Strength from Initial  38 %   Ice - Z Lie (in min.) 5        Lila participated in therapeutic exercises to develop strength, endurance, ROM, flexibility, posture, and core stabilization for 40 minutes including:    LTR x 10  DKTC x 10  Piriformis stretch 2 x 20 sec  Open book 10x RTB resistance  SL clams 15x/ Black Tband  Bridges with 3 sec hold c/Black Tband x  20  Cat/cow x 10  Bird dog10x (verbal and tactile cues for level pelvis)  EIL x 10  Extension in standing x 10  Paloff press with 10# x 15    Peripheral muscle strengthening which included one set of 15-20 repetitions at a slow and controlled 10-13 second per rep pace focused on strengthening supporting musculature in order to improve body mechanics and functional mobility. Patient and therapist focused on proper form during treatment to ensure optimal strengthening of each targeted muscle group.  Machines utilized included:Torso rotation, Leg Ext, Hip Abd, Hip Add, and Leg Press:Leg Curl, Chest Press, Rowing, Triceps, and Biceps    Lila received manual therapy techniques for 0  minutes. The following activities were included:    Pt given cold pack for 5 minutes to low back in z lie.    Patient Education and Home Exercises   Home exercises include:     Cardio program (V5): - 1/31/24   Lifting education (V11): - 3/20/24 Dos and Don'ts of Lifting reviewed  Posture/Lumbar roll: not yet, reminded her of value and ice packs 1/9/24  Fridge Magnet Discharge handout (date given): - 5/7/24  Equipment at home/gym membership: [employer]    Education provided:   - cues w/ex's  - MedX performance  - Precor ex performance  - 1/31/24 Availability of Health coaching.    Written Home Exercises  "Provided: yes. Educated in her "Go-To Fridge Magnet ex's"  Exercises were reviewed and Lila was able to demonstrate them prior to the end of the session.  Lila demonstrated good  understanding of the education provided.     See EMR under Patient Instructions for exercises provided prior visit     Assessment   Patient has attended 20 visits of the [facility] program focusing on aerobic training, isometric testing with dynamic strengthening on MedX machine for spine, whole body strengthening on peripheral strengthening equipment, HEP, and patient education. Patient has completed the [facility] Program and is ready to be transitioned into wellness program. Educated on the importance of continuing exercise and maintaining proper body mechanics and ergonomics in order to fully participate in activities of daily living, work, and leisure activities. At this time, patient demonstrates improvement in ability to reduce symptoms, improved posture, improved spinal ROM and improved strength. Discharge handout with HEP given and reviewed all information given. Patient able to demonstrate and verbalize understanding. Patient does plan to attend wellness and is appropriate for transition. Patient demonstrates:    -Improved lumbar ROM, initially on MedX test 6-42 degrees and currently 0-51 degrees.  -Improved strength at each test point on lumbar med ex IM test with 38% average improvement with reduced pain noted by patient.  -Initial outcome tool score 22% disability and current outcome tool score 0% disability indicating reduced pain and improved function.      Short term goals:  6 weeks or 10 visits   - Pt will demonstrate increased lumbar MedX ROM by at least 3 degrees from the initial ROM value with improvements noted in functional ROM and ability to perform ADLs. MET  - Pt will demonstrate increased MedX average isometric strength value by 25% from initial test resulting in improved ability to perform bending, lifting, " and carrying activities safely, confidently. MET  - Pt will report a reduction in worst pain score by 1-2 points for improved tolerance for bending activity. MET  - Pt able to perform HEP correctly with minimal cueing or supervision from therapist to encourage independent management of symptoms. MET     Long term goals: 10 weeks or 20 visits   - Pt will demonstrate increased lumbar MedX ROM by at least 6 degrees from initial ROM value, resulting in improved ability to perform functional forward bending while standing and sitting. MET  - Pt will demonstrate increased MedX average isometric strength value by 50% from initial test resulting in improved ability to perform bending, lifting, and carrying activities safely and confidently. Not met   - Pt to demonstrate ability to independently control and reduce their pain through posture positioning and mechanical movements throughout a typical day. MET  - Pt will demonstrate reduced pain and improved functional outcomes as reported on the Oswestry Disability Index by reaching a score of 10% or less in order to demonstrate subjective improvement in pt's condition. MET  - Pt will demonstrate independence with the HEP at discharge.  MET  - Pt will be able to work out without increase in back symptoms. (patient goal)  MET    Plan   Discharge from physical therapy.      Therapist: Kelly Phoenix, PT    6/3/2024

## 2024-06-13 ENCOUNTER — DOCUMENTATION ONLY (OUTPATIENT)
Dept: REHABILITATION | Facility: HOSPITAL | Age: 54
End: 2024-06-13
Payer: COMMERCIAL

## 2024-06-13 NOTE — PROGRESS NOTES
Health  Wellness Visit Note    Name: Lila Pineda  Clinic Number: [id number]  Physician: No ref. provider found  Diagnosis: No diagnosis found.  Past Medical History:   Diagnosis Date    Perimenopausal     Shingles      Visit Number: 21  Precautions: Standard      1st PT visit:  01/04/2024  Year of care end date:  January 2025  Mindbody plan: 7 Months EMPLOYEE  Patient level: NP    Time In: 11:00 AM  Time Out: 12:00 PM  Total Treatment Time: 60 Minutes    Wellness [facility] 2022  Handout on this week's wellness topic Anxiety provided  along with a discussion on what it means, the benefits, and suggestions for practice.  Reviewed last week's topic of n/a (today is the patient's initial Wellness session).    Subjective:   Patient reports last week she did have some low back pain over three days. She was mopping and had to stop because the pain increased. Stretching helped subside her pain and she iced once at night. Since finishing [facility], patient does her stretches sparingly. She works a lot and gets plenty of steps in daily.     Objective:   Lila CALLAWAY completed therapeutic stretches (EIL, FARHAD) and the following MedX exercise machines: core lumbar, torso rotation l/r, leg extension, leg curl, upright row, chest press, biceps curl, triceps extension, leg press    See exercise log in patient folder for rate of exertion and repetitions completed.       Fitness Machine Education Key:  E=education on equipment initiated and further follow up and education needed  I=independent with  and exercise.  The patient:  Adjusts machines to his/her settings  Uses equipment levers, pins, weights safely  Maintains safe and correct posture while exercising  Moves through exercise with correct pace and control  Gets on and off equipment safely      Lumbar/Cervical Ext.  Torso Rotation  Leg Press    Leg Extension  Seated Leg Curl  Chest Press    Seated Row  Hip ADD  Hip ABD    Triceps Extension  Bicep Curl  Other:       [x] Indicates exercise has been taught for home  Lumbar/Cervical Ext. [] Torso Rotation [] Leg Press []   Leg Extension [] Seated Leg Curl [] Chest Press []   Seated Row [] Hip ADD [] Hip ABD []   Triceps Extension [] Bicep Curl [] Other:        Assessment:   Patient tolerated Patient tolerated MedX Core Lumbar Strength and all other peripheral exercises without an increase in symptoms. Patient warmed up on treadmill for 5 minutes, stretched, and iced low back for 5 minutes after the workout.     Plan:  Continue with established plan of care towards wellness goals.     Health  : Renetta Ireland  6/13/2024

## 2024-06-17 ENCOUNTER — DOCUMENTATION ONLY (OUTPATIENT)
Dept: REHABILITATION | Facility: HOSPITAL | Age: 54
End: 2024-06-17
Payer: COMMERCIAL

## 2024-06-17 NOTE — PROGRESS NOTES
Health  Wellness Visit Note    Name: Lila Pineda  Clinic Number: [id number]  Physician: No ref. provider found  Diagnosis: No diagnosis found.  Past Medical History:   Diagnosis Date    Perimenopausal     Shingles      Visit Number: 22  Precautions: Standard      1st PT visit:  01/04/2024  Year of care end date:  January 2025  Mindbody plan: 7 Months EMPLOYEE  Patient level: C    Time In: 11:00 AM  Time Out: 11:41 AM  Total Treatment Time: 41 Minutes    Wellness [facility] 2022  Handout on this week's wellness topic Fear-Avoidance provided  along with a discussion on what it means, the benefits, and suggestions for practice.  Reviewed last week's topic of Anxiety.     Subjective:   Patient reports the low back pain she had last week has subsided. She does have some sciatica discomfort this morning. She is hopeful that the stretches will help subside that pain. She works a lot and gets plenty of steps in daily. Patient plans to get new shoes after Wellness today. She is having some heel pain. She does not ice at home.     Objective:   Lila CALLAWAY completed therapeutic stretches (EIL, FARHAD) and the following MedX exercise machines: core lumbar, torso rotation l/r, leg extension, leg curl, upright row, chest press, biceps curl, triceps extension, leg press    See exercise log in patient folder for rate of exertion and repetitions completed.       Fitness Machine Education Key:  E=education on equipment initiated and further follow up and education needed  I=independent with  and exercise.  The patient:  Adjusts machines to his/her settings  Uses equipment levers, pins, weights safely  Maintains safe and correct posture while exercising  Moves through exercise with correct pace and control  Gets on and off equipment safely      Lumbar/Cervical Ext.  Torso Rotation  Leg Press    Leg Extension  Seated Leg Curl  Chest Press    Seated Row  Hip ADD  Hip ABD    Triceps Extension  Bicep Curl  Other:      [x]  Indicates exercise has been taught for home  Lumbar/Cervical Ext. [] Torso Rotation [] Leg Press []   Leg Extension [] Seated Leg Curl [] Chest Press []   Seated Row [] Hip ADD [] Hip ABD []   Triceps Extension [] Bicep Curl [] Other:        Assessment:   Patient tolerated Patient tolerated MedX Core Lumbar Strength and all other peripheral exercises without an increase in symptoms. Patient warmed up on treadmill for 5 minutes, stretched, and iced low back for 5 minutes after the workout.     Plan:  Continue with established plan of care towards wellness goals.     Health  : Renetta Ireland  6/17/2024

## 2024-06-24 ENCOUNTER — DOCUMENTATION ONLY (OUTPATIENT)
Dept: REHABILITATION | Facility: HOSPITAL | Age: 54
End: 2024-06-24
Payer: COMMERCIAL

## 2024-06-24 NOTE — PROGRESS NOTES
Health  Wellness Visit Note    Name: Lila Pineda  Clinic Number: [id number]  Physician: No ref. provider found  Diagnosis: No diagnosis found.  Past Medical History:   Diagnosis Date    Perimenopausal     Shingles      Visit Number: 23  Precautions: Standard      1st PT visit:  01/04/2024  Year of care end date:  January 2025  Mindbody plan: 7 Months EMPLOYEE  Patient level: C    Time In: 11:00 AM  Time Out: 11:50 AM  Total Treatment Time: 50 Minutes    Wellness [facility] 2022  Handout on this week's wellness topic Emotional Expression provided  along with a discussion on what it means, the benefits, and suggestions for practice.  Reviewed last week's topic of Fear-Avoidance.     Subjective:   Patient reports that she has some full body soreness coming into Wellness today. She went to the gym and increased sets and reps on all machines equivalent to the ones in [facility]. The next day she organized her home and moved things from inside into her garage. Between those two days, she iced and stretched to help subside the soreness. Patient's sciatica discomfort is at a minimum today. She went to the [employer] and got new work shoes. Last week she mentioned some heel pain while standing and walking for long hours.     Objective:   Lila CALLAWAY completed therapeutic stretches (EIL, FARHAD) and the following MedX exercise machines: core lumbar, torso rotation l/r, leg extension, leg curl, upright row, chest press, biceps curl, triceps extension, leg press    See exercise log in patient folder for rate of exertion and repetitions completed.       Fitness Machine Education Key:  E=education on equipment initiated and further follow up and education needed  I=independent with  and exercise.  The patient:  Adjusts machines to his/her settings  Uses equipment levers, pins, weights safely  Maintains safe and correct posture while exercising  Moves through exercise with correct pace and control  Gets on and  off equipment safely      Lumbar/Cervical Ext. E Torso Rotation E Leg Press    Leg Extension  Seated Leg Curl  Chest Press    Seated Row  Hip ADD  Hip ABD    Triceps Extension  Bicep Curl  Other:      [x] Indicates exercise has been taught for home  Lumbar/Cervical Ext. [] Torso Rotation [] Leg Press []   Leg Extension [] Seated Leg Curl [] Chest Press []   Seated Row [] Hip ADD [] Hip ABD []   Triceps Extension [] Bicep Curl [] Other:        Assessment:   Patient tolerated Patient tolerated MedX Core Lumbar Strength and all other peripheral exercises without an increase in symptoms. Patient warmed up on treadmill for 5 minutes, stretched, and iced low back for 5 minutes after the workout.     Plan:  Continue with established plan of care towards wellness goals.     Health  : Renetta Ireland  6/24/2024

## 2024-07-01 ENCOUNTER — OFFICE VISIT (OUTPATIENT)
Dept: PODIATRY | Facility: CLINIC | Age: 54
End: 2024-07-01
Payer: COMMERCIAL

## 2024-07-01 VITALS
SYSTOLIC BLOOD PRESSURE: 138 MMHG | BODY MASS INDEX: 28.07 KG/M2 | TEMPERATURE: 98 F | DIASTOLIC BLOOD PRESSURE: 86 MMHG | HEART RATE: 87 BPM | WEIGHT: 152.56 LBS | HEIGHT: 62 IN

## 2024-07-01 DIAGNOSIS — G57.52 TARSAL TUNNEL SYNDROME, LEFT: ICD-10-CM

## 2024-07-01 DIAGNOSIS — M79.672 LEFT FOOT PAIN: Primary | ICD-10-CM

## 2024-07-01 PROCEDURE — 96372 THER/PROPH/DIAG INJ SC/IM: CPT | Mod: S$GLB,,, | Performed by: STUDENT IN AN ORGANIZED HEALTH CARE EDUCATION/TRAINING PROGRAM

## 2024-07-01 PROCEDURE — 3079F DIAST BP 80-89 MM HG: CPT | Mod: CPTII,S$GLB,, | Performed by: STUDENT IN AN ORGANIZED HEALTH CARE EDUCATION/TRAINING PROGRAM

## 2024-07-01 PROCEDURE — 3075F SYST BP GE 130 - 139MM HG: CPT | Mod: CPTII,S$GLB,, | Performed by: STUDENT IN AN ORGANIZED HEALTH CARE EDUCATION/TRAINING PROGRAM

## 2024-07-01 PROCEDURE — 64450 NJX AA&/STRD OTHER PN/BRANCH: CPT | Mod: LT,S$GLB,, | Performed by: STUDENT IN AN ORGANIZED HEALTH CARE EDUCATION/TRAINING PROGRAM

## 2024-07-01 PROCEDURE — 99204 OFFICE O/P NEW MOD 45 MIN: CPT | Mod: 25,S$GLB,, | Performed by: STUDENT IN AN ORGANIZED HEALTH CARE EDUCATION/TRAINING PROGRAM

## 2024-07-01 PROCEDURE — 1159F MED LIST DOCD IN RCRD: CPT | Mod: CPTII,S$GLB,, | Performed by: STUDENT IN AN ORGANIZED HEALTH CARE EDUCATION/TRAINING PROGRAM

## 2024-07-01 PROCEDURE — 3008F BODY MASS INDEX DOCD: CPT | Mod: CPTII,S$GLB,, | Performed by: STUDENT IN AN ORGANIZED HEALTH CARE EDUCATION/TRAINING PROGRAM

## 2024-07-01 PROCEDURE — 99999 PR PBB SHADOW E&M-EST. PATIENT-LVL III: CPT | Mod: PBBFAC,,, | Performed by: STUDENT IN AN ORGANIZED HEALTH CARE EDUCATION/TRAINING PROGRAM

## 2024-07-01 RX ORDER — IBUPROFEN 600 MG/1
600 TABLET ORAL 2 TIMES DAILY
Qty: 60 TABLET | Refills: 1 | Status: SHIPPED | OUTPATIENT
Start: 2024-07-01

## 2024-07-01 RX ORDER — DEXAMETHASONE SODIUM PHOSPHATE 4 MG/ML
8 INJECTION, SOLUTION INTRA-ARTICULAR; INTRALESIONAL; INTRAMUSCULAR; INTRAVENOUS; SOFT TISSUE
Status: COMPLETED | OUTPATIENT
Start: 2024-07-01 | End: 2024-07-01

## 2024-07-01 RX ADMIN — DEXAMETHASONE SODIUM PHOSPHATE 8 MG: 4 INJECTION, SOLUTION INTRA-ARTICULAR; INTRALESIONAL; INTRAMUSCULAR; INTRAVENOUS; SOFT TISSUE at 05:07

## 2024-07-01 NOTE — PROGRESS NOTES
Subjective:     Patient    Lila Pineda is a 53 y.o. female.    Problem    Presents for left plantar heel pain which has been on and off for a while but has become more consistent and pronounced within the past couple of weeks. The pain hurts mostly with first step and with activity. Admits to cramping in both feet. She had hammertoe surgery bilaterally 25 years ago. She works as a nurse.     Left plantar heel pain on and off with certain shoes. More consistent the past couple of weeks, more prominent. Hurts when first stepping down at night. Had hammertoes urgery on both feet in the past. 25 years ago surgery. Has had cramping in both feet. Nurse working 3 12s.     History    History obtained from patient and review of medical records.     Past Medical History:   Diagnosis Date    Perimenopausal     Shingles        Past Surgical History:   Procedure Laterality Date    breast reduction  Right     right breast only-2011    BREAST SURGERY Bilateral     mastoplexy-2011    COLONOSCOPY N/A 12/20/2021    Procedure: COLONOSCOPY SUPREP RAPID TEST;  Surgeon: Delbert Encinas MD;  Location: [facility];  Service: Endoscopy;  Laterality: N/A;        Objective:     Vitals  Wt Readings from Last 1 Encounters:   07/01/24 69.2 kg (152 lb 8.9 oz)     Temp Readings from Last 1 Encounters:   07/01/24 98.1 °F (36.7 °C) (Oral)     BP Readings from Last 1 Encounters:   07/01/24 138/86     Pulse Readings from Last 1 Encounters:   07/01/24 87       Dermatological Exam    Skin:   Pedal hair growth, skin color, and skin texture normal on left    Nails:  All nails normal in length, thickness, color    Vascular Exam    Arteries:   Posterior tibial artery palpable on left  Dorsalis pedis artery palpable on left    Veins:   Superficial veins unremarkable on left    Swelling:   None on left    Neurological Exam    [place] touch test:  6/6 sites sensed, light touch intact    Provocation Sign:  + at tibial nerve and medial calcaneal  nerves  on left     Musculoskeletal Exam    Footwear:  Athletic on right  Athletic on left    Gait Exam:   Ambulatory Status: Ambulatory  Gait: Normal  Assistive Devices: None    Foot Progression Angle:  Normal on right  Normal on left     Left Lower Extremity Additional Findings:  Negative windlass test  Left foot and ankle function, strength, and range of motion unremarkable except as noted above.    Imaging and Other Tests    Imaging:  Independently reviewed and interpreted imaging, findings are as follows: N/A     Assessment:     Encounter Diagnoses   Name Primary?    Left foot pain Yes    Tarsal tunnel syndrome, left         Plan:     I counseled the patient on her conditions, their implications and medical management.    Left tarsal tunnel syndrome, pain: chronic exacerbated  -Recommended local steroid injection. Reviewed potential risks, benefits, alternatives. Patient amenable. After time out was performed, the procedure site was marked and the patient was prepped aseptically. A diagnostic and therapeutic injection of 8 mg dexamethasone and 1 mL 1% lidocaine plain was given under sterile technique using a 25g x 1.5 needle into the left foot tibial nerve at site of worst pain. Adhesive bandage applied.   -Ibuprofen PRN.        Return to clinic PRN.

## 2024-07-09 ENCOUNTER — DOCUMENTATION ONLY (OUTPATIENT)
Dept: REHABILITATION | Facility: HOSPITAL | Age: 54
End: 2024-07-09
Payer: COMMERCIAL

## 2024-07-09 NOTE — PROGRESS NOTES
Health  Wellness Visit Note    Name: Lila iPneda  Clinic Number: [id number]  Physician: No ref. provider found  Diagnosis: No diagnosis found.  Past Medical History:   Diagnosis Date    Perimenopausal     Shingles      Visit Number: 24  Precautions: Standard      1st PT visit:  01/04/2024  Year of care end date:  January 2025  Mindbody plan: 7 Months EMPLOYEE  Patient level: C    Time In: 10:00 AM  Time Out: 10:48 AM  Total Treatment Time: 48 Minutes    Wellness [employer] 2022  Handout on this week's wellness topic Play was provided along with a discussion on what it means, the benefits, and suggestions for practice.  Reviewed last week's topic of NA-missed last week.     Subjective:   Patient reports that she has some sciatica and soreness coming into Wellness today. It's a little better than yesterday. She skipped the gym because of heel pain last week She went to the [employer] and got new work shoes. She had a steroid injection to reduce her heel pain while standing and walking for long hours.     Objective:   Lila CALLAWAY completed therapeutic stretches (EIL, FARHAD) and the following MedX exercise machines: core lumbar, torso rotation l/r, leg extension, leg curl, upright row, chest press, biceps curl, triceps extension, leg press    See exercise log in patient folder for rate of exertion and repetitions completed.       Fitness Machine Education Key:  E=education on equipment initiated and further follow up and education needed  I=independent with  and exercise.  The patient:  Adjusts machines to his/her settings  Uses equipment levers, pins, weights safely  Maintains safe and correct posture while exercising  Moves through exercise with correct pace and control  Gets on and off equipment safely      Lumbar/Cervical Ext. E Torso Rotation E Leg Press    Leg Extension  Seated Leg Curl  Chest Press    Seated Row  Hip ADD  Hip ABD    Triceps Extension  Bicep Curl  Other:      [x] Indicates  exercise has been taught for home  Lumbar/Cervical Ext. [] Torso Rotation [] Leg Press []   Leg Extension [] Seated Leg Curl [] Chest Press []   Seated Row [] Hip ADD [] Hip ABD []   Triceps Extension [] Bicep Curl [] Other:        Assessment:   Patient tolerated Patient tolerated MedX Core Lumbar Strength and all other peripheral exercises without an increase in symptoms. Patient warmed up on treadmill for 5 minutes, stretched, and iced low back for 5 minutes after the workout.     Plan:  Continue with established plan of care towards wellness goals.     Health  : Marge Lantigua  7/9/2024

## 2024-07-16 ENCOUNTER — DOCUMENTATION ONLY (OUTPATIENT)
Dept: REHABILITATION | Facility: HOSPITAL | Age: 54
End: 2024-07-16
Payer: COMMERCIAL

## 2024-07-16 NOTE — PROGRESS NOTES
Health  Wellness Visit Note    Name: Lila Pineda  Clinic Number: [id number]  Physician: No ref. provider found  Diagnosis: No diagnosis found.  Past Medical History:   Diagnosis Date    Perimenopausal     Shingles      Visit Number: 25  Precautions: Standard      1st PT visit:  01/04/2024  Year of care end date:  January 2025  Mindbody plan: 7 Months EMPLOYEE  Patient level: C    Time In: 9:05 AM  Time Out: 9:50 AM  Total Treatment Time: 45 Minutes    Wellness [facility] 2022  Handout on this week's wellness topic Laughter Therapy was provided along with a discussion on what it means, the benefits, and suggestions for practice.  Reviewed last week's topic of Play.     Subjective:   Patient reports no complaints of low back pain. She still has slight heel pain but the injection from her Podiatrist is helping manage it. She plans to start physical therapy after her follow-up with her podiatrist in September.  She has been doing her stretches at home everyday. She has not been to the gym in two weeks. Patient only ices when need be.     Patient wears a back brace when doing strenuous work.     Objective:   Lila CALLAWAY completed therapeutic stretches (EIL, FARHAD) and the following MedX exercise machines: core lumbar, torso rotation l/r, leg extension, leg curl, upright row, chest press, biceps curl, triceps extension, leg press    See exercise log in patient folder for rate of exertion and repetitions completed.       Fitness Machine Education Key:  E=education on equipment initiated and further follow up and education needed  I=independent with  and exercise.  The patient:  Adjusts machines to his/her settings  Uses equipment levers, pins, weights safely  Maintains safe and correct posture while exercising  Moves through exercise with correct pace and control  Gets on and off equipment safely      Lumbar/Cervical Ext. E Torso Rotation E Leg Press E   Leg Extension  Seated Leg Curl  Chest Press    Seated Row   Hip ADD E Hip ABD E   Triceps Extension  Bicep Curl  Other:      [x] Indicates exercise has been taught for home  Lumbar/Cervical Ext. [] Torso Rotation [] Leg Press []   Leg Extension [] Seated Leg Curl [] Chest Press []   Seated Row [] Hip ADD [] Hip ABD []   Triceps Extension [] Bicep Curl [] Other:        Assessment:   Patient tolerated Patient tolerated MedX Core Lumbar Strength and all other peripheral exercises without an increase in symptoms. Patient warmed up on treadmill for 5 minutes, stretched, and iced low back for 5 minutes after the workout.     Plan:  Continue with established plan of care towards wellness goals.     Health  : Renetta Ireland  7/16/2024

## 2024-07-25 ENCOUNTER — DOCUMENTATION ONLY (OUTPATIENT)
Dept: REHABILITATION | Facility: HOSPITAL | Age: 54
End: 2024-07-25
Payer: COMMERCIAL

## 2024-07-25 NOTE — PROGRESS NOTES
Health  Wellness Visit Note    Name: Lila Pineda  Clinic Number: [id number]  Physician: No ref. provider found  Diagnosis: No diagnosis found.  Past Medical History:   Diagnosis Date    Perimenopausal     Shingles      Visit Number: 26  Precautions: Standard      1st PT visit:  01/04/2024  Year of care end date:  January 2025  Mindbody plan: 7 Months EMPLOYEE  Patient level: C    Time In: 9:00 AM  Time Out: 9:45 AM  Total Treatment Time: 45 Minutes    Wellness [facility] 2022  Handout on this week's wellness topic Scheduled Self-Dating  was provided along with a discussion on what it means, the benefits, and suggestions for practice.  Reviewed last week's topic of Laughter Therapy.    Subjective:   Patient reports to Wellness after a week long vacation. She has no low back or foot pain coming into today's session. The steroid injection is helping a lot with her foot pain. Since her last Wellness session, she has been walking in the park. She has not been to the gym on her own. Patient has not iced her back outside of Wellness.     Patient wears a back brace when doing strenuous work.     Objective:   Lila CALLAWAY completed therapeutic stretches (EIL, FARHAD) and the following MedX exercise machines: core lumbar, torso rotation l/r, leg extension, leg curl, upright row, chest press, biceps curl, triceps extension, leg press    See exercise log in patient folder for rate of exertion and repetitions completed.       Fitness Machine Education Key:  E=education on equipment initiated and further follow up and education needed  I=independent with  and exercise.  The patient:  Adjusts machines to his/her settings  Uses equipment levers, pins, weights safely  Maintains safe and correct posture while exercising  Moves through exercise with correct pace and control  Gets on and off equipment safely      Lumbar/Cervical Ext. E Torso Rotation E Leg Press E   Leg Extension  Seated Leg Curl  Chest Press    Seated Row  Hip  ADD E Hip ABD E   Triceps Extension  Bicep Curl  Other:      [x] Indicates exercise has been taught for home  Lumbar/Cervical Ext. [] Torso Rotation [] Leg Press []   Leg Extension [] Seated Leg Curl [] Chest Press []   Seated Row [] Hip ADD [] Hip ABD []   Triceps Extension [] Bicep Curl [] Other:        Assessment:   Patient tolerated Patient tolerated MedX Core Lumbar Strength and all other peripheral exercises without an increase in symptoms. Patient warmed up on treadmill for 5 minutes, stretched, and iced low back for 5 minutes after the workout.     Plan:  Continue with established plan of care towards wellness goals.     Health  : Renetta Ireland  7/25/2024

## 2024-08-05 ENCOUNTER — DOCUMENTATION ONLY (OUTPATIENT)
Dept: REHABILITATION | Facility: HOSPITAL | Age: 54
End: 2024-08-05
Payer: COMMERCIAL

## 2024-08-16 ENCOUNTER — DOCUMENTATION ONLY (OUTPATIENT)
Dept: REHABILITATION | Facility: HOSPITAL | Age: 54
End: 2024-08-16
Payer: COMMERCIAL

## 2024-08-16 NOTE — PROGRESS NOTES
Health  Wellness Visit Note    Name: Lila Pineda  Clinic Number: [id number]  Physician: No ref. provider found  Diagnosis: No diagnosis found.  Past Medical History:   Diagnosis Date    Perimenopausal     Shingles      Visit Number: 28  Precautions: Standard      1st PT visit:  01/04/2024  Year of care end date:  January 2025  Mindbody plan: 7 Months EMPLOYEE  Patient level: C    Time In: 9:00 AM  Time Out: 9:57 AM  Total Treatment Time: 57 Minutes    Wellness [facility] 2022  Handout on this week's wellness topic Stages of Change was provided along with a discussion on what it means, the benefits, and suggestions for practice.  Reviewed last week's topic of Small Steps.     Subjective:   Patient reports no complaints of low back pain but does have pain in her foot. She sees her podiatrist next month to discuss her discomfort. Patient did not make it to the gym last week but did walk in the park. She does her stretches when need be. She does not ice outside of Wellness.     Patient wears a back brace when doing strenuous work.     Objective:   Lila CALLAWAY completed therapeutic stretches (EIL, FARHAD) and the following MedX exercise machines: core lumbar, torso rotation l/r, leg extension, leg curl, upright row, chest press, biceps curl, triceps extension, leg press    See exercise log in patient folder for rate of exertion and repetitions completed.       Fitness Machine Education Key:  E=education on equipment initiated and further follow up and education needed  I=independent with  and exercise.  The patient:  Adjusts machines to his/her settings  Uses equipment levers, pins, weights safely  Maintains safe and correct posture while exercising  Moves through exercise with correct pace and control  Gets on and off equipment safely      Lumbar/Cervical Ext. E Torso Rotation E Leg Press E   Leg Extension E Seated Leg Curl E Chest Press    Seated Row  Hip ADD E Hip ABD E   Triceps Extension  Bicep Curl  Other:       [x] Indicates exercise has been taught for home  Lumbar/Cervical Ext. [] Torso Rotation [] Leg Press []   Leg Extension [] Seated Leg Curl [] Chest Press []   Seated Row [] Hip ADD [] Hip ABD []   Triceps Extension [] Bicep Curl [] Other:        Assessment:   Patient tolerated Patient tolerated MedX Core Lumbar Strength and all other peripheral exercises without an increase in symptoms. Patient warmed up on treadmill for 5 minutes, stretched, and iced low back for 5 minutes after the workout.     Plan:  Continue with established plan of care towards wellness goals.     Health  : Renetta Ireland  8/16/2024

## 2024-08-19 ENCOUNTER — DOCUMENTATION ONLY (OUTPATIENT)
Dept: REHABILITATION | Facility: HOSPITAL | Age: 54
End: 2024-08-19
Payer: COMMERCIAL

## 2024-08-19 NOTE — PROGRESS NOTES
Health  Wellness Visit Note    Name: Lila Pineda  Clinic Number: [id number]  Physician: No ref. provider found  Diagnosis: No diagnosis found.  Past Medical History:   Diagnosis Date    Perimenopausal     Shingles      Visit Number: 29  Precautions: Standard      1st PT visit:  01/04/2024  Year of care end date:  January 2025  Mindbody plan: 7 Months EMPLOYEE  Patient level: C    Time In: 9:00 AM  Time Out: 10:00 AM  Total Treatment Time: 60 Minutes    Wellness [facility] 2022  Handout on this week's wellness topic Journaling was provided along with a discussion on what it means, the benefits, and suggestions for practice.  Reviewed last week's topic of Stages of Change.      Subjective:   Patient reports no complaints of back pain since her last Wellness session. Last session was her first time doing barbell bench press, her chest and triceps were sore the following day. She is still having some foot pain but plans to see her podiatrist next month.  Patient did not make it to the gym last week but did walk in the park and moved her daughter into her college dorm. She does her stretches when need be. She does not ice outside of Wellness.     Patient wears a back brace when doing strenuous yard work.     Objective:   Lila CALLAWAY completed therapeutic stretches (EIL, FARHAD) and the following MedX exercise machines: core lumbar, torso rotation l/r, leg extension, leg curl, upright row, chest press, biceps curl, triceps extension, leg press    See exercise log in patient folder for rate of exertion and repetitions completed.       Fitness Machine Education Key:  E=education on equipment initiated and further follow up and education needed  I=independent with  and exercise.  The patient:  Adjusts machines to his/her settings  Uses equipment levers, pins, weights safely  Maintains safe and correct posture while exercising  Moves through exercise with correct pace and control  Gets on and off equipment  safely      Lumbar/Cervical Ext. E Torso Rotation E Leg Press E   Leg Extension E Seated Leg Curl E Chest Press X   Seated Row E Hip ADD E Hip ABD E   Triceps Extension X Bicep Curl X Other:      [x] Indicates exercise has been taught for home  Lumbar/Cervical Ext. [] Torso Rotation [] Leg Press []   Leg Extension [] Seated Leg Curl [] Chest Press []   Seated Row [] Hip ADD [] Hip ABD []   Triceps Extension [] Bicep Curl [] Other:        Assessment:   Patient tolerated Patient tolerated MedX Core Lumbar Strength and all other peripheral exercises without an increase in symptoms. Patient warmed up on treadmill for 5 minutes, stretched, and iced low back for 5 minutes after the workout.     Plan:  Continue with established plan of care towards wellness goals.     Health  : Renetta Ireland  8/19/2024

## 2024-08-30 ENCOUNTER — DOCUMENTATION ONLY (OUTPATIENT)
Dept: REHABILITATION | Facility: HOSPITAL | Age: 54
End: 2024-08-30
Payer: COMMERCIAL

## 2024-08-30 NOTE — PROGRESS NOTES
Health  Wellness Visit Note    Name: Lila Pineda  Clinic Number: [id number]  Physician: No ref. provider found  Diagnosis: No diagnosis found.  Past Medical History:   Diagnosis Date    Perimenopausal     Shingles      Visit Number: 30  Precautions: Standard      1st PT visit:  01/04/2024  Year of care end date:  January 2025  Mindbody plan: 7 Months EMPLOYEE  Patient level: C    Time In: 9:10 AM (heavy rain)  Time Out: 10:00 AM  Total Treatment Time: 60 Minutes    Wellness [facility] 2022  Handout on this week's wellness topic Decisional Balance was provided along with a discussion on what it means, the benefits, and suggestions for practice.  Reviewed last week's topic of Journaling.      Subjective:   Patient reports that her back is feeling better. No complaints of back pain since her last Wellness session.   Last session was her first time doing barbell bench press, her chest and triceps were sore the following day. Today she tried chest press with 6# dumbbells.  She is still having some foot pain but plans to see her podiatrist next month.  Patient did not make it to the gym last week but did walk in the park and moved her daughter into her college dorm. She does her stretches when need be. She does not ice outside of Wellness.     Patient wears a back brace when doing strenuous yard work.     Objective:   Lila CALLAWAY completed therapeutic stretches (EIL, FARHAD) and the following MedX exercise machines: core lumbar, torso rotation l/r, leg extension, leg curl, upright row, chest press, biceps curl, triceps extension, leg press    See exercise log in patient folder for rate of exertion and repetitions completed.       Fitness Machine Education Key:  E=education on equipment initiated and further follow up and education needed  I=independent with  and exercise.  The patient:  Adjusts machines to his/her settings  Uses equipment levers, pins, weights safely  Maintains safe and correct posture while  exercising  Moves through exercise with correct pace and control  Gets on and off equipment safely      Lumbar/Cervical Ext. E Torso Rotation E Leg Press E   Leg Extension E Seated Leg Curl E Chest Press X   Seated Row E Hip ADD E Hip ABD E   Triceps Extension X Bicep Curl X Other:      [x] Indicates exercise has been taught for home  Lumbar/Cervical Ext. [] Torso Rotation [] Leg Press []   Leg Extension [] Seated Leg Curl [] Chest Press []   Seated Row [] Hip ADD [] Hip ABD []   Triceps Extension [] Bicep Curl [] Other:        Assessment:   Patient tolerated Patient tolerated MedX Core Lumbar Strength and all other peripheral exercises without an increase in symptoms. Patient warmed up on treadmill for 5 minutes, stretched, and iced low back for 5 minutes after the workout.     Plan:  Continue with established plan of care towards wellness goals.     Health  : Marge Lantigua  8/30/2024

## 2024-10-02 DIAGNOSIS — Z12.31 OTHER SCREENING MAMMOGRAM: ICD-10-CM

## 2024-10-17 ENCOUNTER — PATIENT MESSAGE (OUTPATIENT)
Dept: RESEARCH | Facility: HOSPITAL | Age: 54
End: 2024-10-17
Payer: COMMERCIAL

## 2024-10-21 ENCOUNTER — DOCUMENTATION ONLY (OUTPATIENT)
Dept: REHABILITATION | Facility: HOSPITAL | Age: 54
End: 2024-10-21
Payer: COMMERCIAL

## 2024-10-21 NOTE — PROGRESS NOTES
Health  Wellness Visit Note    Name: Lila Pineda  Clinic Number: [id number]  Physician: No ref. provider found  Diagnosis: No diagnosis found.  Past Medical History:   Diagnosis Date    Perimenopausal     Shingles      Visit Number: 31  Precautions: Standard      1st PT visit:  01/04/2024  Year of care end date:  January 2025  Mindbody plan: 7 Months EMPLOYEE  Patient level: C    Time In: 9:08 AM   Time Out: 10:00 AM  Total Treatment Time: 53 Minutes    Wellness [facility] 2022  Handout on this week's wellness topic Boundaries was provided along with a discussion on what it means, the benefits, and suggestions for practice.  Reviewed last week's topic of n/a (patient did not attend Wellness last week).     Subjective:   Patient reports to Wellness after missing a month. She has been very busy with work. 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 Patient had moments of back pain since her last Wellness session. Her biggest complaint is her foot. She is still having a lot of heel pain daily. She has not been to the gym on her own but has been renovating her home. Between moving furniture and boxes she has been active. Patient does her stretches daily. She ices when necessary.     Patient wears a back brace when doing strenuous yard work.     Objective:   Lila CALLAWAY completed therapeutic stretches (EIL, FARHAD) and the following MedX exercise machines: core lumbar, torso rotation l/r, leg extension, leg curl, upright row, chest press, biceps curl, triceps extension, leg press    See exercise log in patient folder for rate of exertion and repetitions completed.       Fitness Machine Education Key:  E=education on equipment initiated and further follow up and education needed  I=independent with  and exercise.  The patient:  Adjusts machines to his/her settings  Uses equipment levers, pins, weights safely  Maintains safe and correct posture while exercising  Moves through  exercise with correct pace and control  Gets on and off equipment safely      Lumbar/Cervical Ext. E Torso Rotation E Leg Press E   Leg Extension E Seated Leg Curl E Chest Press X   Seated Row E Hip ADD E Hip ABD E   Triceps Extension X Bicep Curl X Other:      [x] Indicates exercise has been taught for home  Lumbar/Cervical Ext. [] Torso Rotation [] Leg Press []   Leg Extension [] Seated Leg Curl [] Chest Press []   Seated Row [] Hip ADD [] Hip ABD []   Triceps Extension [] Bicep Curl [] Other:        Assessment:   Patient tolerated Patient tolerated MedX Core Lumbar Strength and all other peripheral exercises without an increase in symptoms. Patient warmed up on treadmill for 5 minutes, stretched, and iced low back for 5 minutes after the workout.     Plan:  Continue with established plan of care towards wellness goals.     Health  : Renetta Ireland  10/21/2024

## 2024-10-31 ENCOUNTER — DOCUMENTATION ONLY (OUTPATIENT)
Dept: REHABILITATION | Facility: HOSPITAL | Age: 54
End: 2024-10-31
Payer: COMMERCIAL

## 2024-11-07 ENCOUNTER — OFFICE VISIT (OUTPATIENT)
Dept: OBSTETRICS AND GYNECOLOGY | Facility: CLINIC | Age: 54
End: 2024-11-07
Payer: COMMERCIAL

## 2024-11-07 VITALS
BODY MASS INDEX: 27.54 KG/M2 | DIASTOLIC BLOOD PRESSURE: 87 MMHG | SYSTOLIC BLOOD PRESSURE: 146 MMHG | WEIGHT: 150.56 LBS

## 2024-11-07 DIAGNOSIS — Z01.419 WELL WOMAN EXAM WITH ROUTINE GYNECOLOGICAL EXAM: Primary | ICD-10-CM

## 2024-11-07 DIAGNOSIS — Z12.39 BREAST CANCER SCREENING, HIGH RISK PATIENT: ICD-10-CM

## 2024-11-07 PROCEDURE — 99396 PREV VISIT EST AGE 40-64: CPT | Mod: S$GLB,,, | Performed by: STUDENT IN AN ORGANIZED HEALTH CARE EDUCATION/TRAINING PROGRAM

## 2024-11-07 PROCEDURE — 3008F BODY MASS INDEX DOCD: CPT | Mod: CPTII,S$GLB,, | Performed by: STUDENT IN AN ORGANIZED HEALTH CARE EDUCATION/TRAINING PROGRAM

## 2024-11-07 PROCEDURE — 99999 PR PBB SHADOW E&M-EST. PATIENT-LVL III: CPT | Mod: PBBFAC,,, | Performed by: STUDENT IN AN ORGANIZED HEALTH CARE EDUCATION/TRAINING PROGRAM

## 2024-11-07 PROCEDURE — 1159F MED LIST DOCD IN RCRD: CPT | Mod: CPTII,S$GLB,, | Performed by: STUDENT IN AN ORGANIZED HEALTH CARE EDUCATION/TRAINING PROGRAM

## 2024-11-07 PROCEDURE — 3079F DIAST BP 80-89 MM HG: CPT | Mod: CPTII,S$GLB,, | Performed by: STUDENT IN AN ORGANIZED HEALTH CARE EDUCATION/TRAINING PROGRAM

## 2024-11-07 PROCEDURE — 3077F SYST BP >= 140 MM HG: CPT | Mod: CPTII,S$GLB,, | Performed by: STUDENT IN AN ORGANIZED HEALTH CARE EDUCATION/TRAINING PROGRAM

## 2024-11-08 NOTE — PROGRESS NOTES
CC: Well woman exam    HPI:  Lila Pineda is a 54 y.o. female  presents for a well woman exam.  She reports breast tenderness with caffeine, sister was diagnosed with breast cancer and passed quickly after diagnosis (aggressive cancer but she reports her sister was BRCA neg). Her LMP was 1.5 years ago, sometimes has cramping.       Patient history:   Past Medical History:   Diagnosis Date    Perimenopausal     Shingles      Past Surgical History:   Procedure Laterality Date    breast reduction  Right     right breast only-    BREAST SURGERY Bilateral     mastoplexy-    COLONOSCOPY N/A 2021    Procedure: COLONOSCOPY SUPREP RAPID TEST;  Surgeon: Delbert Encinas MD;  Location: [facility];  Service: Endoscopy;  Laterality: N/A;     OB History    Para Term  AB Living   3 3 3     3   SAB IAB Ectopic Multiple Live Births           3      # Outcome Date GA Lbr Simón/2nd Weight Sex Type Anes PTL Lv   3 Term     F    GAGAN   2 Term     F    GAGAN   1 Term     M    GAGAN       GYN  Menopausal: Yes  History of abnormal paps: DENIES  Abnormal or postmenopausal bleeding: DENIES  History of abnormal mammograms:DENIES   Family history of breast or ovarian cancer:  yes  Any breast masses, pain, skin changes, or nipple discharge:  intermittent tenderness  Possible recent STD exposure: denies  Contraception: N/A    Pap: Done today  Mammogram: BIRADS1, T-C=13.14% 2023, ordered by PCP      Family History   Problem Relation Name Age of Onset    Liver disease Mother      Hashimoto's thyroiditis Mother      Breast cancer Sister  49    Epilepsy Sister      Immunodeficiency Daughter          CVID     Social History     Tobacco Use    Smoking status: Never    Smokeless tobacco: Never   Substance Use Topics    Alcohol use: Yes     Comment: rare - holidays only    Drug use: Never     Allergies: Patient has no known allergies.    Current Outpatient Medications:     ibuprofen (ADVIL,MOTRIN) 600 MG tablet,  Take 1 tablet (600 mg total) by mouth 2 (two) times a day., Disp: 60 tablet, Rfl: 1       ROS:  GENERAL: Denies weight gain or weight loss. Feeling well overall.   SKIN: Denies rash or lesions.   HEAD: Denies head injury or headache.   NODES: Denies enlarged lymph nodes.   CHEST: Denies chest pain or shortness of breath.   CARDIOVASCULAR: Denies palpitations or left sided chest pain.   ABDOMEN: No abdominal pain, constipation, diarrhea, nausea, vomiting or rectal bleeding.   URINARY: No frequency, dysuria, hematuria, or burning on urination.  REPRODUCTIVE: See HPI.   BREASTS: The patient performs breast self-examination and denies pain, lumps, or nipple discharge.   HEMATOLOGIC: No easy bruisability or excessive bleeding.  MUSCULOSKELETAL: Denies joint pain or swelling.   NEUROLOGIC: Denies syncope or weakness.   PSYCHIATRIC: Denies depression, anxiety or mood swings.    Objective:   BP (!) 146/87   Wt 68.3 kg (150 lb 9.2 oz)   LMP 11/20/2021   BMI 27.54 kg/m²       Physical Exam:  APPEARANCE: Well nourished, well developed, in no acute distress.  AFFECT: WNL, alert and oriented x 3  SKIN: No acne or hirsutism  NECK: Neck symmetric without masses or thyromegaly  CHEST: Good respiratory effect  ABDOMEN: Soft.  No tenderness or masses.  No hepatosplenomegaly.  No hernias.  BREASTS: Symmetrical, no skin changes or visible lesions.  No palpable masses, nipple discharge bilaterally.  PELVIC: Normal external genitalia without lesions.  Normal hair distribution.  Adequate perineal body, normal urethral meatus.  Vagina without lesions or discharge.  Cervix pink, without lesions, discharge or tenderness.  No significant cystocele or rectocele.  Bimanual exam shows uterus to be normal size, regular, mobile and nontender.  Adnexa without masses or tenderness.   EXTREMITIES: No edema.    ASSESSMENT AND PLAN  1. Well woman exam with routine gynecological exam  Liquid-Based Pap Smear, Screening    HPV High Risk Genotypes, PCR       2. Breast cancer screening, high risk patient  MRI Breast w/o Contrast, Bilateral          Annual exam  Breast and pelvic exam: WNL  Patient counseled on ASCCP guidelines for cervical cytology screening  Cervical screening: done today  Patient counseled on current recommendations for breast cancer screening  Mammogram screening: discussed with dense breast tissue and significant family history, MRI may be better imaging. Will try to get approved with insurance but has mammogram order as well  STD testing: not requested today   Osteoporosis screening at 64      She was counseled to follow up with her PCP for other routine health maintenance      Follow up in about 1 year (around 11/7/2025).      Tess Reese MD  OBGYN [facility]

## 2024-11-14 ENCOUNTER — TELEPHONE (OUTPATIENT)
Dept: OBSTETRICS AND GYNECOLOGY | Facility: CLINIC | Age: 54
End: 2024-11-14
Payer: COMMERCIAL

## 2024-11-14 NOTE — TELEPHONE ENCOUNTER
I spoke with pt regarding her concerns. Per pt she would like her provider to go over her pap smear results with her. Her message was sent to her healthcare provider.

## 2024-11-18 ENCOUNTER — PATIENT MESSAGE (OUTPATIENT)
Dept: OBSTETRICS AND GYNECOLOGY | Facility: CLINIC | Age: 54
End: 2024-11-18
Payer: COMMERCIAL

## 2024-11-18 ENCOUNTER — TELEPHONE (OUTPATIENT)
Dept: OBSTETRICS AND GYNECOLOGY | Facility: CLINIC | Age: 54
End: 2024-11-18
Payer: COMMERCIAL

## 2024-12-05 ENCOUNTER — HOSPITAL ENCOUNTER (OUTPATIENT)
Dept: RADIOLOGY | Facility: HOSPITAL | Age: 54
Discharge: HOME | End: 2024-12-05
Attending: STUDENT IN AN ORGANIZED HEALTH CARE EDUCATION/TRAINING PROGRAM
Payer: COMMERCIAL

## 2024-12-05 DIAGNOSIS — Z12.39 BREAST CANCER SCREENING, HIGH RISK PATIENT: ICD-10-CM

## 2024-12-05 PROCEDURE — 25500020 PHARM REV CODE 255: Performed by: STUDENT IN AN ORGANIZED HEALTH CARE EDUCATION/TRAINING PROGRAM

## 2024-12-05 PROCEDURE — 77049 MRI BREAST C-+ W/CAD BI: CPT | Mod: 26,,, | Performed by: RADIOLOGY

## 2024-12-05 PROCEDURE — 77049 MRI BREAST C-+ W/CAD BI: CPT | Mod: TC

## 2024-12-05 PROCEDURE — A9577 INJ MULTIHANCE: HCPCS | Performed by: STUDENT IN AN ORGANIZED HEALTH CARE EDUCATION/TRAINING PROGRAM

## 2024-12-05 RX ADMIN — GADOBENATE DIMEGLUMINE 15 ML: 529 INJECTION, SOLUTION INTRAVENOUS at 03:12

## 2025-02-21 NOTE — PROGRESS NOTES
[facility] Progress Note  2025          Reason for Visit:      had concerns including Annual Exam.       History of Present Illness:       Patient presents today for follow up for annual exam    CURRENT SYMPTOMS:  She reports a lump behind her ear present for approximately one year without pain or changes in size. She notes previous swelling in the same area during orthodontic treatment, which she attributed to metal irritation of the gums. She fell on the sidewalk this morning and has an abrasion that appears superficial on right knee.    MENOPAUSE:  She reports experiencing menopausal symptoms. She denies fevers but is uncertain about night sweats due to ongoing hormonal changes.    BACK PAIN:  She reports significant improvement in back pain symptoms after attending Healthy Back program.    EXERCISE:  She walks four miles daily and reports feeling good with a more balanced life since starting a work-from-home position.    FAMILY HISTORY:  She has a significant family history of breast cancer. Her sister  of breast cancer, which was discovered at an advanced stage (stage three or four) despite regular mammogram screening for 10 years prior to diagnosis.  For this reason, she had MRI instead of MMG this past year, and her gyn plans to do MMG next year.  Her lifestyme risk of breast cancer from Tyer-Cruzick score was around 13%    PAST MEDICAL HISTORY:  She has a history of shingles.    MEDICATIONS:  She takes weekly vitamin D supplementation.    SOCIAL HISTORY:  She denies smoking and reports minimal alcohol consumption with 1-2 drinks per year.         HM  Pt decliend flu shot, covid shot.  Encouraged shingrix at pharmacy  Pt declined pneumococcal vaccine as well  Up to date on Mmg, PAP, Colonosocpy       Problem List:     Problem List[1]      Surgical History:     She has a past surgical history that includes breast reduction  (Right); Colonoscopy (N/A, 2021); and Breast  "surgery (Bilateral).      Family History:      Her family history includes Breast cancer (age of onset: 49) in her sister; Epilepsy in her sister; Hashimoto's thyroiditis in her mother; Immunodeficiency in her daughter; Liver disease in her mother.      Social History:     She reports that she has never smoked. She has never used smokeless tobacco. She reports current alcohol use. She reports that she does not use drugs.      Medications:     Current Medications[2]        Allergies:   She is allergic to bactrim [sulfamethoxazole-trimethoprim].      Review of Systems:     Review of Systems   Constitutional:  Negative for chills and fever.   HENT:  Negative for ear pain and sore throat.    Respiratory:  Negative for cough, shortness of breath and wheezing.    Cardiovascular:  Negative for chest pain and palpitations.   Gastrointestinal:  Negative for constipation, diarrhea, nausea and vomiting.   Genitourinary:  Negative for dysuria and hematuria.   Musculoskeletal:  Negative for joint swelling and joint deformity.   Neurological:  Negative for dizziness and weakness.           Physical Exam:     Temp:             Blood Pressure:  128/84        Pulse:   73     Respirations:  14  Weight:   66.7 kg (147 lb 0.8 oz)  Height:   5' 2" (1.575 m)  BMI:     Body mass index is 26.9 kg/m².    Physical Exam  Constitutional:       General: She is not in acute distress.  HENT:      Right Ear: Tympanic membrane normal.      Left Ear: Tympanic membrane normal.      Nose: No congestion or rhinorrhea.      Mouth/Throat:      Pharynx: No oropharyngeal exudate or posterior oropharyngeal erythema.   Cardiovascular:      Rate and Rhythm: Normal rate and regular rhythm.   Pulmonary:      Effort: Pulmonary effort is normal. No respiratory distress.      Breath sounds: No wheezing.   Abdominal:      Palpations: Abdomen is soft.      Tenderness: There is no abdominal tenderness.   Skin:     General: Skin is warm.   Neurological:      General: " No focal deficit present.      Mental Status: She is alert and oriented to person, place, and time.       Labs/Imaging/Testing:      Most recent CBC:  Lab Results   Component Value Date    WBC 7.44 10/27/2023    HGB 15.2 10/27/2023     10/27/2023    MCV 87 10/27/2023       Most recent Lipids:  Lab Results   Component Value Date    CHOL 217 (H) 10/27/2023    HDL 50 10/27/2023    LDLCALC 134.8 10/27/2023    TRIG 161 (H) 10/27/2023       Most recent Chemistry:  Lab Results   Component Value Date     10/27/2023    K 3.5 10/27/2023     10/27/2023    CO2 23 10/27/2023    BUN 9 10/27/2023    CREATININE 0.7 10/27/2023    GLU 87 10/27/2023    CALCIUM 9.2 10/27/2023    ALT 37 10/27/2023    AST 23 10/27/2023    ALKPHOS 112 10/27/2023    PROT 6.9 10/27/2023    ALBUMIN 4.2 10/27/2023       Other tests:  Lab Results   Component Value Date    TSH 1.154 10/27/2023    FREET4 0.97 09/14/2016    INR 0.9 12/21/2011    HGBA1C 5.3 10/27/2023    IRON 62 09/14/2016    FERRITIN 20 09/14/2016    OQMEAORT06 369 09/14/2016    XWUMVYVO34OF 32 10/27/2023    SEDRATE 3 07/12/2006       Assessment and Plan:     1. Well adult exam  - CBC Auto Differential; Future  - Comprehensive Metabolic Panel; Future  - Hemoglobin A1C; Future  - Lipid Panel; Future  - TSH; Future    2. Vitamin D deficiency  - Vitamin D; Future    3. Dyslipidemia    4. Chronic back pain, unspecified back location, unspecified back pain laterality    5. Abrasion of right knee, initial encounter     6. Dyslpidemia  - Evaluated cholesterol levels, noting triglycerides slightly elevated at 161 but 10-year cardiovascular risk low at 2.4%.  - Determined that the triglyceride level is not high enough to warrant medication, as it's well below 400.  - Advised to keep an eye on cholesterol levels, as they are not currently worrisome.  - Recommend continuing dietary changes to manage triglyceride levels.  - Educated on dietary modifications to help lower triglycerides,  focusing on reducing starches like potatoes, white rice, and bread.  - Recommend continuing efforts to reduce starch intake.    MENOPAUSE:  - Noted that the patient is going through menopause and experiencing hormonal changes.    BACK PAIN:  - Noted improvement in back pain after the patient attended the Healthy Back program.  - Acknowledged the patient's understanding of the need to be mindful of back health.  - Encouraged the patient to continue managing back pain through increased physical activity, including daily walks.    LUMP BEHIND EAR:  - Assessed small lump behind patient's ear, likely not parotid or lymph node given location and consistency.  - Examined the lump and noted it does not feel like a lymph node, possibly a small cyst.  - Determined watchful waiting appropriate given lack of symptoms or growth.  - Considered the possibility of imaging if concerns arise.  - Instructed the patient to contact the office if concerns arise about the lump behind the ear.    FALL INJURY:  - Noted that patient reports a fall on the sidewalk during morning routine.  - Observed a small bruise and swelling from the fall.  - Assessed the injury as superficial.  - Recommend cleaning with soap and water, applying topical antibiotic ointment, and keeping it covered while wet.  - Advised the patient to send a picture through the portal if the injury worsens.  - Instructed the patient to wash abrasion from fall with soap and water, apply topical antibiotic ointment, and keep covered while wet and raw.  - Advised that the abrasion can be left open once scabbed.  - Recommend contacting the office if abrasion from fall worsens.  - Suggested sending a picture of the abrasion through the patient portal if needed for reassessment.    BREAST CANCER SCREENING:  - Reviewed patient's recent breast cancer screening, noting MRI baseline completed and plan for future mammograms with gynecologist.  - Noted family history of breast cancer,  with sister who passed away from the disease.  - Reviewed mammogram results showing a lifetime risk of 13%, which is not considered high risk.  - Confirmed that MRI results were normal.  - Acknowledged the gynecologist's recommendation for MRI screening.  - Noted that the patient has a plan for alternating mammograms and MRI screenings with the gynecologist.    SHINGLES VACCINATION:  - Explained possibility of getting shingles vaccine even after having had shingles outbreak.  - Discussed potential side effects of shingles vaccine, including feeling unwell for a few days.  - Suggested considering shingles vaccination in the future, available at the pharmacy.      LABS:  - Routine labs including vitamin D level ordered.    FOLLOW UP:  RTC 1 year or sooner prn         No follow-ups on file.       Thanh Galdamez MD  2/24/2025    This note was prepared using voice-recognition software.  Although efforts are made to proofread the note, some errors may persist in the final document.         [1]   Patient Active Problem List  Diagnosis    Perimenopausal    Vitamin D deficiency    Decreased strength of trunk and back   [2]   Current Outpatient Medications:     ergocalciferol (VITAMIN D2) 50,000 unit Cap, Take 50,000 Units by mouth every 7 days., Disp: , Rfl:     mv-min/iron/folic/calcium/vitK (WOMEN'S MULTIVITAMIN ORAL), Take by mouth., Disp: , Rfl:     ibuprofen (ADVIL,MOTRIN) 600 MG tablet, Take 1 tablet (600 mg total) by mouth 2 (two) times a day. (Patient not taking: Reported on 2/24/2025), Disp: 60 tablet, Rfl: 1

## 2025-02-24 ENCOUNTER — LAB VISIT (OUTPATIENT)
Dept: LAB | Facility: HOSPITAL | Age: 55
End: 2025-02-24
Attending: INTERNAL MEDICINE
Payer: COMMERCIAL

## 2025-02-24 ENCOUNTER — OFFICE VISIT (OUTPATIENT)
Dept: PRIMARY CARE CLINIC | Facility: CLINIC | Age: 55
End: 2025-02-24
Payer: COMMERCIAL

## 2025-02-24 ENCOUNTER — RESULTS FOLLOW-UP (OUTPATIENT)
Dept: PRIMARY CARE CLINIC | Facility: CLINIC | Age: 55
End: 2025-02-24

## 2025-02-24 VITALS
WEIGHT: 147.06 LBS | OXYGEN SATURATION: 96 % | SYSTOLIC BLOOD PRESSURE: 128 MMHG | DIASTOLIC BLOOD PRESSURE: 84 MMHG | RESPIRATION RATE: 14 BRPM | HEIGHT: 62 IN | HEART RATE: 73 BPM | BODY MASS INDEX: 27.06 KG/M2

## 2025-02-24 DIAGNOSIS — E55.9 VITAMIN D DEFICIENCY: ICD-10-CM

## 2025-02-24 DIAGNOSIS — Z00.00 WELL ADULT EXAM: Primary | ICD-10-CM

## 2025-02-24 DIAGNOSIS — M54.9 CHRONIC BACK PAIN, UNSPECIFIED BACK LOCATION, UNSPECIFIED BACK PAIN LATERALITY: ICD-10-CM

## 2025-02-24 DIAGNOSIS — G89.29 CHRONIC BACK PAIN, UNSPECIFIED BACK LOCATION, UNSPECIFIED BACK PAIN LATERALITY: ICD-10-CM

## 2025-02-24 DIAGNOSIS — S80.211A ABRASION OF RIGHT KNEE, INITIAL ENCOUNTER: ICD-10-CM

## 2025-02-24 DIAGNOSIS — Z00.00 WELL ADULT EXAM: ICD-10-CM

## 2025-02-24 DIAGNOSIS — E78.5 DYSLIPIDEMIA: ICD-10-CM

## 2025-02-24 LAB
25(OH)D3+25(OH)D2 SERPL-MCNC: 35 NG/ML (ref 30–96)
ALBUMIN SERPL BCP-MCNC: 4.2 G/DL (ref 3.5–5.2)
ALP SERPL-CCNC: 104 U/L (ref 40–150)
ALT SERPL W/O P-5'-P-CCNC: 26 U/L (ref 10–44)
ANION GAP SERPL CALC-SCNC: 12 MMOL/L (ref 8–16)
AST SERPL-CCNC: 32 U/L (ref 10–40)
BASOPHILS # BLD AUTO: 0.03 K/UL (ref 0–0.2)
BASOPHILS NFR BLD: 0.5 % (ref 0–1.9)
BILIRUB SERPL-MCNC: 0.9 MG/DL (ref 0.1–1)
BUN SERPL-MCNC: 8 MG/DL (ref 6–20)
CALCIUM SERPL-MCNC: 8.9 MG/DL (ref 8.7–10.5)
CHLORIDE SERPL-SCNC: 109 MMOL/L (ref 95–110)
CHOLEST SERPL-MCNC: 185 MG/DL (ref 120–199)
CHOLEST/HDLC SERPL: 3.6 {RATIO} (ref 2–5)
CO2 SERPL-SCNC: 22 MMOL/L (ref 23–29)
CREAT SERPL-MCNC: 0.6 MG/DL (ref 0.5–1.4)
DIFFERENTIAL METHOD BLD: NORMAL
EOSINOPHIL # BLD AUTO: 0 K/UL (ref 0–0.5)
EOSINOPHIL NFR BLD: 0.3 % (ref 0–8)
ERYTHROCYTE [DISTWIDTH] IN BLOOD BY AUTOMATED COUNT: 13.1 % (ref 11.5–14.5)
EST. GFR  (NO RACE VARIABLE): >60 ML/MIN/1.73 M^2
ESTIMATED AVG GLUCOSE: 105 MG/DL (ref 68–131)
GLUCOSE SERPL-MCNC: 86 MG/DL (ref 70–110)
HBA1C MFR BLD: 5.3 % (ref 4–5.6)
HCT VFR BLD AUTO: 42.7 % (ref 37–48.5)
HDLC SERPL-MCNC: 51 MG/DL (ref 40–75)
HDLC SERPL: 27.6 % (ref 20–50)
HGB BLD-MCNC: 13.8 G/DL (ref 12–16)
IMM GRANULOCYTES # BLD AUTO: 0.01 K/UL (ref 0–0.04)
IMM GRANULOCYTES NFR BLD AUTO: 0.2 % (ref 0–0.5)
LDLC SERPL CALC-MCNC: 114.6 MG/DL (ref 63–159)
LYMPHOCYTES # BLD AUTO: 1.8 K/UL (ref 1–4.8)
LYMPHOCYTES NFR BLD: 30.6 % (ref 18–48)
MCH RBC QN AUTO: 29 PG (ref 27–31)
MCHC RBC AUTO-ENTMCNC: 32.3 G/DL (ref 32–36)
MCV RBC AUTO: 90 FL (ref 82–98)
MONOCYTES # BLD AUTO: 0.4 K/UL (ref 0.3–1)
MONOCYTES NFR BLD: 7 % (ref 4–15)
NEUTROPHILS # BLD AUTO: 3.7 K/UL (ref 1.8–7.7)
NEUTROPHILS NFR BLD: 61.4 % (ref 38–73)
NONHDLC SERPL-MCNC: 134 MG/DL
NRBC BLD-RTO: 0 /100 WBC
PLATELET # BLD AUTO: 240 K/UL (ref 150–450)
PMV BLD AUTO: 11.9 FL (ref 9.2–12.9)
POTASSIUM SERPL-SCNC: 3.7 MMOL/L (ref 3.5–5.1)
PROT SERPL-MCNC: 6.6 G/DL (ref 6–8.4)
RBC # BLD AUTO: 4.76 M/UL (ref 4–5.4)
SODIUM SERPL-SCNC: 143 MMOL/L (ref 136–145)
TRIGL SERPL-MCNC: 97 MG/DL (ref 30–150)
TSH SERPL DL<=0.005 MIU/L-ACNC: 1.04 UIU/ML (ref 0.4–4)
WBC # BLD AUTO: 5.98 K/UL (ref 3.9–12.7)

## 2025-02-24 PROCEDURE — 83036 HEMOGLOBIN GLYCOSYLATED A1C: CPT | Performed by: INTERNAL MEDICINE

## 2025-02-24 PROCEDURE — 3074F SYST BP LT 130 MM HG: CPT | Mod: CPTII,S$GLB,, | Performed by: INTERNAL MEDICINE

## 2025-02-24 PROCEDURE — 82306 VITAMIN D 25 HYDROXY: CPT | Performed by: INTERNAL MEDICINE

## 2025-02-24 PROCEDURE — 84443 ASSAY THYROID STIM HORMONE: CPT | Performed by: INTERNAL MEDICINE

## 2025-02-24 PROCEDURE — 3008F BODY MASS INDEX DOCD: CPT | Mod: CPTII,S$GLB,, | Performed by: INTERNAL MEDICINE

## 2025-02-24 PROCEDURE — 3079F DIAST BP 80-89 MM HG: CPT | Mod: CPTII,S$GLB,, | Performed by: INTERNAL MEDICINE

## 2025-02-24 PROCEDURE — 1159F MED LIST DOCD IN RCRD: CPT | Mod: CPTII,S$GLB,, | Performed by: INTERNAL MEDICINE

## 2025-02-24 PROCEDURE — 85025 COMPLETE CBC W/AUTO DIFF WBC: CPT | Performed by: INTERNAL MEDICINE

## 2025-02-24 PROCEDURE — 99999 PR PBB SHADOW E&M-EST. PATIENT-LVL IV: CPT | Mod: PBBFAC,,, | Performed by: INTERNAL MEDICINE

## 2025-02-24 PROCEDURE — 80061 LIPID PANEL: CPT | Performed by: INTERNAL MEDICINE

## 2025-02-24 PROCEDURE — 80053 COMPREHEN METABOLIC PANEL: CPT | Performed by: INTERNAL MEDICINE

## 2025-02-24 PROCEDURE — 99396 PREV VISIT EST AGE 40-64: CPT | Mod: S$GLB,,, | Performed by: INTERNAL MEDICINE

## 2025-02-24 RX ORDER — ERGOCALCIFEROL 1.25 MG/1
50000 CAPSULE ORAL
COMMUNITY

## 2025-03-17 ENCOUNTER — PATIENT MESSAGE (OUTPATIENT)
Dept: ADMINISTRATIVE | Facility: HOSPITAL | Age: 55
End: 2025-03-17
Payer: COMMERCIAL

## 2025-04-16 ENCOUNTER — OFFICE VISIT (OUTPATIENT)
Dept: OBSTETRICS AND GYNECOLOGY | Facility: CLINIC | Age: 55
End: 2025-04-16
Payer: COMMERCIAL

## 2025-04-16 VITALS
SYSTOLIC BLOOD PRESSURE: 137 MMHG | HEIGHT: 62 IN | BODY MASS INDEX: 26.56 KG/M2 | DIASTOLIC BLOOD PRESSURE: 91 MMHG | WEIGHT: 144.31 LBS

## 2025-04-16 DIAGNOSIS — N90.7 SEBACEOUS CYST OF LABIA: Primary | ICD-10-CM

## 2025-04-16 PROCEDURE — 3044F HG A1C LEVEL LT 7.0%: CPT | Mod: CPTII,S$GLB,, | Performed by: STUDENT IN AN ORGANIZED HEALTH CARE EDUCATION/TRAINING PROGRAM

## 2025-04-16 PROCEDURE — 3080F DIAST BP >= 90 MM HG: CPT | Mod: CPTII,S$GLB,, | Performed by: STUDENT IN AN ORGANIZED HEALTH CARE EDUCATION/TRAINING PROGRAM

## 2025-04-16 PROCEDURE — 3075F SYST BP GE 130 - 139MM HG: CPT | Mod: CPTII,S$GLB,, | Performed by: STUDENT IN AN ORGANIZED HEALTH CARE EDUCATION/TRAINING PROGRAM

## 2025-04-16 PROCEDURE — 1159F MED LIST DOCD IN RCRD: CPT | Mod: CPTII,S$GLB,, | Performed by: STUDENT IN AN ORGANIZED HEALTH CARE EDUCATION/TRAINING PROGRAM

## 2025-04-16 PROCEDURE — 3008F BODY MASS INDEX DOCD: CPT | Mod: CPTII,S$GLB,, | Performed by: STUDENT IN AN ORGANIZED HEALTH CARE EDUCATION/TRAINING PROGRAM

## 2025-04-16 PROCEDURE — 99999 PR PBB SHADOW E&M-EST. PATIENT-LVL III: CPT | Mod: PBBFAC,,, | Performed by: STUDENT IN AN ORGANIZED HEALTH CARE EDUCATION/TRAINING PROGRAM

## 2025-04-16 PROCEDURE — 99213 OFFICE O/P EST LOW 20 MIN: CPT | Mod: S$GLB,,, | Performed by: STUDENT IN AN ORGANIZED HEALTH CARE EDUCATION/TRAINING PROGRAM

## 2025-04-16 RX ORDER — MUPIROCIN 20 MG/G
OINTMENT TOPICAL 3 TIMES DAILY
Qty: 15 G | Refills: 0 | Status: SHIPPED | OUTPATIENT
Start: 2025-04-16

## 2025-04-16 NOTE — PROGRESS NOTES
"CC: Vaginal Pain    HPI:  Lila Pineda is a 54 y.o. female  presents with complaint of left labial lesions, feels like hair bump. Has been present for a few months, no pain or drainage from the area, wanted to have it checked out before she popped it at home in case it was something abnormal.    ROS:  GENERAL: No fever, chills, fatigability or weight loss.  VULVAR: No pain,  and no itching.  VAGINAL: No relaxation, no itching, no discharge, no abnormal bleeding and no lesions.  ABDOMEN: No abdominal pain. Denies nausea. Denies vomiting. No diarrhea. No constipation  BREAST: Denies pain. No lumps. No discharge.  URINARY: No incontinence, no nocturia, no frequency and no dysuria.  CARDIOVASCULAR: No chest pain. No shortness of breath. No leg cramps.  NEUROLOGICAL: No headaches. No vision changes.      Patient History:  Past Medical History:   Diagnosis Date    Perimenopausal     Shingles      Past Surgical History:   Procedure Laterality Date    breast reduction  Right     right breast only-    BREAST SURGERY Bilateral     mastoplexy-    COLONOSCOPY N/A 2021    Procedure: COLONOSCOPY SUPREP RAPID TEST;  Surgeon: Delbert Encinas MD;  Location: [facility];  Service: Endoscopy;  Laterality: N/A;     Social History[1]  Family History   Problem Relation Name Age of Onset    Liver disease Mother      Hashimoto's thyroiditis Mother      Breast cancer Sister  49    Epilepsy Sister      Immunodeficiency Daughter          CVID     OB History    Para Term  AB Living   3 3 3   3   SAB IAB Ectopic Multiple Live Births       3      # Outcome Date GA Lbr Simón/2nd Weight Sex Type Anes PTL Lv   3 Term     F    GAGAN   2 Term     F    GAGAN   1 Term     M    GAGAN       Objective:   BP (!) 137/91 (BP Location: Left arm, Patient Position: Sitting)   Ht 5' 2" (1.575 m)   Wt 65.5 kg (144 lb 4.7 oz)   LMP 2021   BMI 26.39 kg/m²   Patient's last menstrual period was 2021.      PHYSICAL " EXAM:  APPEARANCE: Well nourished, well developed, in no acute distress.  AFFECT: WNL, alert and oriented x 3  SKIN: No acne or hirsutism  NECK: Neck symmetric without masses or thyromegaly  CHEST: Good respiratory effect  PELVIC: Normal external genitalia, left labia <5mm sebaceous cyst with no drainage NT.  Normal hair distribution.   EXTREMITIES: No edema.      ASSESSMENT and PLAN:    ICD-10-CM ICD-9-CM    1. Sebaceous cyst of labia  N90.7 629.89 mupirocin (BACTROBAN) 2 % ointment          Sebaceous cyst of labia  - discussed benign findings, can drain in clinic if it gets larger/more painful  - will try sitz bath and drainage at home  - topical mupirocin sent for infection prevention       Follow up: as needed if symptoms worsen or WWE      Stephanie Heaney, MD OBGYN [facility]            [1]   Social History  Tobacco Use    Smoking status: Never    Smokeless tobacco: Never   Substance Use Topics    Alcohol use: Not Currently     Comment: rare - holidays only    Drug use: Never

## 2025-04-30 ENCOUNTER — LAB VISIT (OUTPATIENT)
Dept: LAB | Facility: HOSPITAL | Age: 55
End: 2025-04-30
Payer: COMMERCIAL

## 2025-04-30 ENCOUNTER — OFFICE VISIT (OUTPATIENT)
Dept: GASTROENTEROLOGY | Facility: CLINIC | Age: 55
End: 2025-04-30
Payer: COMMERCIAL

## 2025-04-30 VITALS — WEIGHT: 144.38 LBS | BODY MASS INDEX: 26.57 KG/M2 | HEIGHT: 62 IN

## 2025-04-30 DIAGNOSIS — R14.0 ABDOMINAL BLOATING: ICD-10-CM

## 2025-04-30 DIAGNOSIS — R19.5 LOOSE STOOLS: ICD-10-CM

## 2025-04-30 DIAGNOSIS — R14.0 ABDOMINAL BLOATING: Primary | ICD-10-CM

## 2025-04-30 LAB — IGA SERPL-MCNC: 149 MG/DL (ref 40–350)

## 2025-04-30 PROCEDURE — 82784 ASSAY IGA/IGD/IGG/IGM EACH: CPT

## 2025-04-30 PROCEDURE — 86364 TISS TRNSGLTMNASE EA IG CLAS: CPT

## 2025-04-30 PROCEDURE — 36415 COLL VENOUS BLD VENIPUNCTURE: CPT

## 2025-04-30 PROCEDURE — 3008F BODY MASS INDEX DOCD: CPT | Mod: CPTII,S$GLB,,

## 2025-04-30 PROCEDURE — 99204 OFFICE O/P NEW MOD 45 MIN: CPT | Mod: S$GLB,,,

## 2025-04-30 PROCEDURE — 99999 PR PBB SHADOW E&M-EST. PATIENT-LVL III: CPT | Mod: PBBFAC,,,

## 2025-04-30 PROCEDURE — 86677 HELICOBACTER PYLORI ANTIBODY: CPT

## 2025-04-30 PROCEDURE — 3044F HG A1C LEVEL LT 7.0%: CPT | Mod: CPTII,S$GLB,,

## 2025-04-30 PROCEDURE — 1159F MED LIST DOCD IN RCRD: CPT | Mod: CPTII,S$GLB,,

## 2025-04-30 NOTE — PROGRESS NOTES
"     GASTROENTEROLOGY CLINIC NOTE    Reason for visit: The primary encounter diagnosis was Abdominal bloating. A diagnosis of Loose stools was also pertinent to this visit.  Referring provider/PCP: Thanh Galdamez MD    HPI:  Lila Pineda is a 54 y.o. female here today for gas/bloating, loose bowel movements. Symptoms began Friday before easter-- had loose BM's and gas pain. Lasted a few days, about 1 week trial of nexium and modified diet improved symptoms. Now with normal BM's. She reports having sensitive stomach with certain foods and NSAID's.     Prior Endoscopy:  EGD:  Colon: 2021 with Dr. Encinas:  - Diverticulosis in the sigmoid colon.               - Internal hemorrhoids.               - No specimens collected.   - Repeat colonoscopy in 10 years for screening purposes.     (Portions of this note were dictated using voice recognition software and may contain dictation related errors in spelling/grammar/syntax not found on text review)    Review of Systems   Gastrointestinal:  Positive for abdominal pain (gas) and diarrhea (improved).       Past Medical History: has a past medical history of Perimenopausal and Shingles.    Past Surgical History: has a past surgical history that includes breast reduction  (Right); Colonoscopy (N/A, 12/20/2021); and Breast surgery (Bilateral).    Home medications: Medications Ordered Prior to Encounter[1]    Vital signs:  Ht 5' 2" (1.575 m)   Wt 65.5 kg (144 lb 6.4 oz)   LMP 11/20/2021   BMI 26.41 kg/m²     Physical Exam  Constitutional:       Appearance: Normal appearance. She is normal weight.   Abdominal:      General: There is no distension.   Neurological:      Mental Status: She is alert.       I have reviewed associated labs, imaging and notes.     Assessment:  1. Abdominal bloating    2. Loose stools      Symptoms now improved- will check stool/blood to r/o underlying causes d/t recurrence of symptoms.     Plan:  Orders Placed This Encounter    Stool culture "    H. pylori Antibody, IgG    Calprotectin, Stool    Giardia / Cryptosporidum, EIA    IgA    Rotavirus antigen, stool    Stool Exam-Ova,Cysts,Parasites    Tissue Transglutaminase, IgA     Collect stool studies and blood work    F/U as needed    Lindsey Ray, NP [facility]         [1]   Current Outpatient Medications on File Prior to Visit   Medication Sig Dispense Refill    ergocalciferol (VITAMIN D2) 50,000 unit Cap Take 50,000 Units by mouth every 7 days.      ibuprofen (ADVIL,MOTRIN) 600 MG tablet Take 1 tablet (600 mg total) by mouth 2 (two) times a day. 60 tablet 1    mupirocin (BACTROBAN) 2 % ointment Apply topically 3 (three) times daily. 15 g 0    mv-min/iron/folic/calcium/vitK (WOMEN'S MULTIVITAMIN ORAL) Take by mouth.       No current facility-administered medications on file prior to visit.

## 2025-04-30 NOTE — PATIENT INSTRUCTIONS
"What causes gastritis? -- Different things can cause gastritis, including:   An infection in the stomach from bacteria called H. pylori   Medicines called "nonsteroidal antiinflammatory drugs" ("NSAIDs") - These include aspirin, ibuprofen (brand names: Advil, Motrin), and naproxen (brand names: Aleve, Naprosyn).   Drinking alcohol   Conditions in which the body's infection-fighting system attacks the stomach lining   Having a serious or life-threatening illness    What are the symptoms of gastritis? -- Gastritis itself does not cause symptoms. When people do have symptoms, they are due to other conditions that can happen with gastritis, like ulcers. Symptoms from ulcers include:   Pain in the upper belly   Feeling bloated, or feeling full after eating a small amount of food   Decreased appetite   Nausea or vomiting   Vomiting blood, or having black-colored bowel movements   Feeling more tired than usual - This happens if people with gastritis get a condition called "anemia."    Is there anything I can do on my own? -- Maybe. Your doctor might recommend changes depending on what is causing your gastritis. For example:   If NSAIDs are the cause, they will recommend that you avoid these medicines.   If alcohol is the cause, they will recommend that you stop drinking alcohol.    "

## 2025-05-01 LAB — H PYLORI IGG SERPL QL IA: NEGATIVE

## 2025-05-02 ENCOUNTER — LAB VISIT (OUTPATIENT)
Dept: LAB | Facility: HOSPITAL | Age: 55
End: 2025-05-02
Payer: COMMERCIAL

## 2025-05-02 DIAGNOSIS — R14.0 ABDOMINAL BLOATING: ICD-10-CM

## 2025-05-02 DIAGNOSIS — R19.5 LOOSE STOOLS: ICD-10-CM

## 2025-05-02 PROCEDURE — 87328 CRYPTOSPORIDIUM AG IA: CPT

## 2025-05-02 PROCEDURE — 87425 ROTAVIRUS AG IA: CPT

## 2025-05-02 PROCEDURE — 87209 SMEAR COMPLEX STAIN: CPT

## 2025-05-03 LAB — RV AG STL QL IA.RAPID: NEGATIVE

## 2025-05-04 LAB
CRYPTOSP AG SPEC QL: NEGATIVE
G LAMBLIA AG STL QL IA: NEGATIVE

## 2025-05-05 LAB
CALPROTECTIN INTERP (OHS): NORMAL
CALPROTECTIN STOOL (OHS): <5 ΜG/G
W TISSUE TRANSGLUTAMINASE IGA AB: 0.4 U/ML

## 2025-05-08 LAB — O+P STL MICRO: NORMAL

## 2025-05-09 ENCOUNTER — RESULTS FOLLOW-UP (OUTPATIENT)
Dept: GASTROENTEROLOGY | Facility: CLINIC | Age: 55
End: 2025-05-09

## 2025-08-05 ENCOUNTER — TELEPHONE (OUTPATIENT)
Dept: OBSTETRICS AND GYNECOLOGY | Facility: CLINIC | Age: 55
End: 2025-08-05
Payer: COMMERCIAL

## 2025-08-22 ENCOUNTER — OFFICE VISIT (OUTPATIENT)
Dept: PRIMARY CARE CLINIC | Facility: CLINIC | Age: 55
End: 2025-08-22
Payer: COMMERCIAL

## 2025-08-22 ENCOUNTER — LAB VISIT (OUTPATIENT)
Dept: LAB | Facility: HOSPITAL | Age: 55
End: 2025-08-22
Attending: INTERNAL MEDICINE
Payer: COMMERCIAL

## 2025-08-22 VITALS
DIASTOLIC BLOOD PRESSURE: 84 MMHG | HEART RATE: 87 BPM | WEIGHT: 142.88 LBS | SYSTOLIC BLOOD PRESSURE: 120 MMHG | OXYGEN SATURATION: 98 % | HEIGHT: 62 IN | BODY MASS INDEX: 26.29 KG/M2 | RESPIRATION RATE: 14 BRPM

## 2025-08-22 DIAGNOSIS — M62.838 MUSCLE SPASM: ICD-10-CM

## 2025-08-22 DIAGNOSIS — R53.83 FATIGUE, UNSPECIFIED TYPE: ICD-10-CM

## 2025-08-22 DIAGNOSIS — E83.42 HYPOMAGNESEMIA: ICD-10-CM

## 2025-08-22 DIAGNOSIS — R51.9 NONINTRACTABLE HEADACHE, UNSPECIFIED CHRONICITY PATTERN, UNSPECIFIED HEADACHE TYPE: Primary | ICD-10-CM

## 2025-08-22 DIAGNOSIS — G47.33 OSA (OBSTRUCTIVE SLEEP APNEA): ICD-10-CM

## 2025-08-22 LAB
ABSOLUTE EOSINOPHIL (OHS): 0.05 K/UL
ABSOLUTE MONOCYTE (OHS): 0.54 K/UL (ref 0.3–1)
ABSOLUTE NEUTROPHIL COUNT (OHS): 4.57 K/UL (ref 1.8–7.7)
ALBUMIN SERPL BCP-MCNC: 4.3 G/DL (ref 3.5–5.2)
ALP SERPL-CCNC: 108 UNIT/L (ref 40–150)
ALT SERPL W/O P-5'-P-CCNC: 27 UNIT/L (ref 0–55)
ANION GAP (OHS): 11 MMOL/L (ref 8–16)
AST SERPL-CCNC: 26 UNIT/L (ref 0–50)
BASOPHILS # BLD AUTO: 0.04 K/UL
BASOPHILS NFR BLD AUTO: 0.6 %
BILIRUB SERPL-MCNC: 1 MG/DL (ref 0.1–1)
BUN SERPL-MCNC: 11 MG/DL (ref 6–20)
CALCIUM SERPL-MCNC: 9 MG/DL (ref 8.7–10.5)
CHLORIDE SERPL-SCNC: 109 MMOL/L (ref 95–110)
CHOLEST SERPL-MCNC: 190 MG/DL (ref 120–199)
CHOLEST/HDLC SERPL: 3.7 {RATIO} (ref 2–5)
CO2 SERPL-SCNC: 24 MMOL/L (ref 23–29)
CORTIS SERPL-MCNC: 16.9 UG/DL
CREAT SERPL-MCNC: 0.6 MG/DL (ref 0.5–1.4)
EAG (OHS): 108 MG/DL (ref 68–131)
ERYTHROCYTE [DISTWIDTH] IN BLOOD BY AUTOMATED COUNT: 12.8 % (ref 11.5–14.5)
GFR SERPLBLD CREATININE-BSD FMLA CKD-EPI: >60 ML/MIN/1.73/M2
GLUCOSE SERPL-MCNC: 101 MG/DL (ref 70–110)
HBA1C MFR BLD: 5.4 % (ref 4–5.6)
HCT VFR BLD AUTO: 41.2 % (ref 37–48.5)
HDLC SERPL-MCNC: 51 MG/DL (ref 40–75)
HDLC SERPL: 26.8 % (ref 20–50)
HGB BLD-MCNC: 13.8 GM/DL (ref 12–16)
IMM GRANULOCYTES # BLD AUTO: 0.02 K/UL (ref 0–0.04)
IMM GRANULOCYTES NFR BLD AUTO: 0.3 % (ref 0–0.5)
LDLC SERPL CALC-MCNC: 121.6 MG/DL (ref 63–159)
LYMPHOCYTES # BLD AUTO: 2 K/UL (ref 1–4.8)
MAGNESIUM SERPL-MCNC: 2 MG/DL (ref 1.6–2.6)
MCH RBC QN AUTO: 29.1 PG (ref 27–31)
MCHC RBC AUTO-ENTMCNC: 33.5 G/DL (ref 32–36)
MCV RBC AUTO: 87 FL (ref 82–98)
NONHDLC SERPL-MCNC: 139 MG/DL
NUCLEATED RBC (/100WBC) (OHS): 0 /100 WBC
PLATELET # BLD AUTO: 235 K/UL (ref 150–450)
PMV BLD AUTO: 11.6 FL (ref 9.2–12.9)
POTASSIUM SERPL-SCNC: 3.8 MMOL/L (ref 3.5–5.1)
PROT SERPL-MCNC: 6.7 GM/DL (ref 6–8.4)
RBC # BLD AUTO: 4.75 M/UL (ref 4–5.4)
RELATIVE EOSINOPHIL (OHS): 0.7 %
RELATIVE LYMPHOCYTE (OHS): 27.7 % (ref 18–48)
RELATIVE MONOCYTE (OHS): 7.5 % (ref 4–15)
RELATIVE NEUTROPHIL (OHS): 63.2 % (ref 38–73)
SODIUM SERPL-SCNC: 144 MMOL/L (ref 136–145)
T4 FREE SERPL-MCNC: 1.06 NG/DL (ref 0.71–1.51)
TRIGL SERPL-MCNC: 87 MG/DL (ref 30–150)
TSH SERPL-ACNC: 1.38 UIU/ML (ref 0.4–4)
WBC # BLD AUTO: 7.22 K/UL (ref 3.9–12.7)

## 2025-08-22 PROCEDURE — 85025 COMPLETE CBC W/AUTO DIFF WBC: CPT

## 2025-08-22 PROCEDURE — 84443 ASSAY THYROID STIM HORMONE: CPT

## 2025-08-22 PROCEDURE — 83735 ASSAY OF MAGNESIUM: CPT

## 2025-08-22 PROCEDURE — 99999 PR PBB SHADOW E&M-EST. PATIENT-LVL IV: CPT | Mod: PBBFAC,,, | Performed by: INTERNAL MEDICINE

## 2025-08-22 PROCEDURE — 80053 COMPREHEN METABOLIC PANEL: CPT

## 2025-08-22 PROCEDURE — 84439 ASSAY OF FREE THYROXINE: CPT

## 2025-08-22 PROCEDURE — 80061 LIPID PANEL: CPT

## 2025-08-22 PROCEDURE — 36415 COLL VENOUS BLD VENIPUNCTURE: CPT | Mod: PN

## 2025-08-22 PROCEDURE — 83036 HEMOGLOBIN GLYCOSYLATED A1C: CPT

## 2025-08-22 PROCEDURE — 82533 TOTAL CORTISOL: CPT
